# Patient Record
Sex: FEMALE | Race: WHITE | NOT HISPANIC OR LATINO | Employment: UNEMPLOYED | URBAN - METROPOLITAN AREA
[De-identification: names, ages, dates, MRNs, and addresses within clinical notes are randomized per-mention and may not be internally consistent; named-entity substitution may affect disease eponyms.]

---

## 2023-02-22 LAB
ALANINE AMINOTRANSFERASE (SGPT) (U/L) IN SER/PLAS: 14 U/L (ref 7–45)
ALBUMIN (G/DL) IN SER/PLAS: 3.2 G/DL (ref 3.4–5)
ALKALINE PHOSPHATASE (U/L) IN SER/PLAS: 107 U/L (ref 33–110)
ANION GAP IN SER/PLAS: 11 MMOL/L (ref 10–20)
ASPARTATE AMINOTRANSFERASE (SGOT) (U/L) IN SER/PLAS: 14 U/L (ref 9–39)
BILIRUBIN TOTAL (MG/DL) IN SER/PLAS: 0.3 MG/DL (ref 0–1.2)
CALCIUM (MG/DL) IN SER/PLAS: 8.6 MG/DL (ref 8.6–10.3)
CARBON DIOXIDE, TOTAL (MMOL/L) IN SER/PLAS: 24 MMOL/L (ref 21–32)
CHLORIDE (MMOL/L) IN SER/PLAS: 107 MMOL/L (ref 98–107)
CREATININE (MG/DL) IN SER/PLAS: 0.58 MG/DL (ref 0.5–1.05)
ERYTHROCYTE DISTRIBUTION WIDTH (RATIO) BY AUTOMATED COUNT: 17.5 % (ref 11.5–14.5)
ERYTHROCYTE MEAN CORPUSCULAR HEMOGLOBIN CONCENTRATION (G/DL) BY AUTOMATED: 31 G/DL (ref 32–36)
ERYTHROCYTE MEAN CORPUSCULAR VOLUME (FL) BY AUTOMATED COUNT: 85 FL (ref 80–100)
ERYTHROCYTES (10*6/UL) IN BLOOD BY AUTOMATED COUNT: 4.06 X10E12/L (ref 4–5.2)
GFR FEMALE: >90 ML/MIN/1.73M2
GLUCOSE (MG/DL) IN SER/PLAS: 151 MG/DL (ref 74–99)
GLUCOSE, 1 HR SCREEN, PREG: 149 MG/DL
HEMATOCRIT (%) IN BLOOD BY AUTOMATED COUNT: 34.5 % (ref 36–46)
HEMOGLOBIN (G/DL) IN BLOOD: 10.7 G/DL (ref 12–16)
LEUKOCYTES (10*3/UL) IN BLOOD BY AUTOMATED COUNT: 12.1 X10E9/L (ref 4.4–11.3)
PLATELETS (10*3/UL) IN BLOOD AUTOMATED COUNT: 328 X10E9/L (ref 150–450)
POTASSIUM (MMOL/L) IN SER/PLAS: 3.3 MMOL/L (ref 3.5–5.3)
PROTEIN TOTAL: 6.7 G/DL (ref 6.4–8.2)
REFLEX ADDED, ANEMIA PANEL: ABNORMAL
SODIUM (MMOL/L) IN SER/PLAS: 139 MMOL/L (ref 136–145)
UREA NITROGEN (MG/DL) IN SER/PLAS: 5 MG/DL (ref 6–23)

## 2023-02-23 LAB
ANTIBODY SCREEN: NORMAL
FERRITIN, PREGNANCY: 16 UG/L
FOLATE, SERUM, PREGNANCY: >24 NG/ML
IRON (UG/DL) IN SER/PLAS IN PREGNANCY: 55 UG/DL
IRON BINDING CAPACITY (UG/DL) IN PREGNANCY: >505 UG/DL
IRON SATURATION (%) IN PREGNANCY: ABNORMAL %
VITAMIN B12, PREGNANCY: 344 PG/ML

## 2023-03-01 LAB
GLUCOSE THREE HOUR: 143 MG/DL
GLUCOSE TWO HOUR: 172 MG/DL
GLUCOSE, FASTING: 82 MG/DL
GLUCOSE, ONE HOUR: 173 MG/DL
GTTCM: ABNORMAL

## 2023-03-17 LAB
ALANINE AMINOTRANSFERASE (SGPT) (U/L) IN SER/PLAS: 76 U/L (ref 7–45)
ALBUMIN (G/DL) IN SER/PLAS: 3.3 G/DL (ref 3.4–5)
ALKALINE PHOSPHATASE (U/L) IN SER/PLAS: 119 U/L (ref 33–110)
ANION GAP IN SER/PLAS: 14 MMOL/L (ref 10–20)
ASPARTATE AMINOTRANSFERASE (SGOT) (U/L) IN SER/PLAS: 40 U/L (ref 9–39)
BILIRUBIN TOTAL (MG/DL) IN SER/PLAS: 0.3 MG/DL (ref 0–1.2)
CALCIUM (MG/DL) IN SER/PLAS: 8.6 MG/DL (ref 8.6–10.3)
CARBON DIOXIDE, TOTAL (MMOL/L) IN SER/PLAS: 23 MMOL/L (ref 21–32)
CHLORIDE (MMOL/L) IN SER/PLAS: 106 MMOL/L (ref 98–107)
CREATININE (MG/DL) IN SER/PLAS: 0.58 MG/DL (ref 0.5–1.05)
CREATININE (MG/DL) IN URINE: 118 MG/DL (ref 20–320)
ERYTHROCYTE DISTRIBUTION WIDTH (RATIO) BY AUTOMATED COUNT: 18.5 % (ref 11.5–14.5)
ERYTHROCYTE MEAN CORPUSCULAR HEMOGLOBIN CONCENTRATION (G/DL) BY AUTOMATED: 31.2 G/DL (ref 32–36)
ERYTHROCYTE MEAN CORPUSCULAR VOLUME (FL) BY AUTOMATED COUNT: 87 FL (ref 80–100)
ERYTHROCYTES (10*6/UL) IN BLOOD BY AUTOMATED COUNT: 4.02 X10E12/L (ref 4–5.2)
GFR FEMALE: >90 ML/MIN/1.73M2
GLUCOSE (MG/DL) IN SER/PLAS: 80 MG/DL (ref 74–99)
HEMATOCRIT (%) IN BLOOD BY AUTOMATED COUNT: 34.9 % (ref 36–46)
HEMOGLOBIN (G/DL) IN BLOOD: 10.9 G/DL (ref 12–16)
LACTATE DEHYDROGENASE (U/L) IN SER/PLAS BY LAC->PYR RXN: 158 U/L (ref 84–246)
LEUKOCYTES (10*3/UL) IN BLOOD BY AUTOMATED COUNT: 12.6 X10E9/L (ref 4.4–11.3)
PLATELETS (10*3/UL) IN BLOOD AUTOMATED COUNT: 325 X10E9/L (ref 150–450)
POTASSIUM (MMOL/L) IN SER/PLAS: 3.7 MMOL/L (ref 3.5–5.3)
PROTEIN (MG/DL) IN URINE: 20 MG/DL (ref 5–24)
PROTEIN TOTAL: 6.7 G/DL (ref 6.4–8.2)
PROTEIN/CREATININE (MG/MG) IN URINE: 0.17 MG/MG CREAT (ref 0–0.17)
SODIUM (MMOL/L) IN SER/PLAS: 139 MMOL/L (ref 136–145)
URATE (MG/DL) IN SER/PLAS: 4.9 MG/DL (ref 2.3–6.7)
UREA NITROGEN (MG/DL) IN SER/PLAS: 7 MG/DL (ref 6–23)

## 2023-09-19 ENCOUNTER — APPOINTMENT (OUTPATIENT)
Dept: PRIMARY CARE | Facility: CLINIC | Age: 33
End: 2023-09-19
Payer: COMMERCIAL

## 2023-09-26 ENCOUNTER — APPOINTMENT (OUTPATIENT)
Dept: PRIMARY CARE | Facility: CLINIC | Age: 33
End: 2023-09-26
Payer: COMMERCIAL

## 2023-10-04 ENCOUNTER — OFFICE VISIT (OUTPATIENT)
Dept: PRIMARY CARE | Facility: CLINIC | Age: 33
End: 2023-10-04
Payer: COMMERCIAL

## 2023-10-04 VITALS
BODY MASS INDEX: 49.67 KG/M2 | HEIGHT: 60 IN | DIASTOLIC BLOOD PRESSURE: 80 MMHG | WEIGHT: 253 LBS | SYSTOLIC BLOOD PRESSURE: 140 MMHG

## 2023-10-04 DIAGNOSIS — Z00.00 ENCOUNTER FOR PREVENTIVE HEALTH EXAMINATION: Primary | ICD-10-CM

## 2023-10-04 DIAGNOSIS — I87.2 VENOUS INSUFFICIENCY: ICD-10-CM

## 2023-10-04 DIAGNOSIS — R74.8 ELEVATED LIVER ENZYMES: ICD-10-CM

## 2023-10-04 DIAGNOSIS — E66.01 CLASS 3 SEVERE OBESITY WITHOUT SERIOUS COMORBIDITY WITH BODY MASS INDEX (BMI) OF 45.0 TO 49.9 IN ADULT, UNSPECIFIED OBESITY TYPE (MULTI): ICD-10-CM

## 2023-10-04 DIAGNOSIS — M54.50 CHRONIC LOW BACK PAIN WITHOUT SCIATICA, UNSPECIFIED BACK PAIN LATERALITY: ICD-10-CM

## 2023-10-04 DIAGNOSIS — F41.1 GAD (GENERALIZED ANXIETY DISORDER): ICD-10-CM

## 2023-10-04 DIAGNOSIS — Z23 ENCOUNTER FOR IMMUNIZATION: ICD-10-CM

## 2023-10-04 DIAGNOSIS — I10 PRIMARY HYPERTENSION: ICD-10-CM

## 2023-10-04 DIAGNOSIS — G89.29 CHRONIC LOW BACK PAIN WITHOUT SCIATICA, UNSPECIFIED BACK PAIN LATERALITY: ICD-10-CM

## 2023-10-04 PROBLEM — E66.813 CLASS 3 SEVERE OBESITY WITHOUT SERIOUS COMORBIDITY WITH BODY MASS INDEX (BMI) OF 45.0 TO 49.9 IN ADULT: Status: ACTIVE | Noted: 2023-10-04

## 2023-10-04 PROCEDURE — 90471 IMMUNIZATION ADMIN: CPT | Performed by: STUDENT IN AN ORGANIZED HEALTH CARE EDUCATION/TRAINING PROGRAM

## 2023-10-04 PROCEDURE — 3079F DIAST BP 80-89 MM HG: CPT | Performed by: STUDENT IN AN ORGANIZED HEALTH CARE EDUCATION/TRAINING PROGRAM

## 2023-10-04 PROCEDURE — 90686 IIV4 VACC NO PRSV 0.5 ML IM: CPT | Performed by: STUDENT IN AN ORGANIZED HEALTH CARE EDUCATION/TRAINING PROGRAM

## 2023-10-04 PROCEDURE — 1036F TOBACCO NON-USER: CPT | Performed by: STUDENT IN AN ORGANIZED HEALTH CARE EDUCATION/TRAINING PROGRAM

## 2023-10-04 PROCEDURE — 3077F SYST BP >= 140 MM HG: CPT | Performed by: STUDENT IN AN ORGANIZED HEALTH CARE EDUCATION/TRAINING PROGRAM

## 2023-10-04 PROCEDURE — 99214 OFFICE O/P EST MOD 30 MIN: CPT | Performed by: STUDENT IN AN ORGANIZED HEALTH CARE EDUCATION/TRAINING PROGRAM

## 2023-10-04 PROCEDURE — 99395 PREV VISIT EST AGE 18-39: CPT | Performed by: STUDENT IN AN ORGANIZED HEALTH CARE EDUCATION/TRAINING PROGRAM

## 2023-10-04 PROCEDURE — 3008F BODY MASS INDEX DOCD: CPT | Performed by: STUDENT IN AN ORGANIZED HEALTH CARE EDUCATION/TRAINING PROGRAM

## 2023-10-04 RX ORDER — SERTRALINE HYDROCHLORIDE 50 MG/1
50 TABLET, FILM COATED ORAL DAILY
Qty: 90 TABLET | Refills: 1 | Status: SHIPPED | OUTPATIENT
Start: 2023-10-04 | End: 2024-04-22

## 2023-10-04 RX ORDER — AMLODIPINE BESYLATE 5 MG/1
5 TABLET ORAL DAILY
Qty: 90 TABLET | Refills: 1 | Status: SHIPPED | OUTPATIENT
Start: 2023-10-04 | End: 2024-04-04

## 2023-10-04 RX ORDER — SERTRALINE HYDROCHLORIDE 50 MG/1
50 TABLET, FILM COATED ORAL DAILY
COMMUNITY
Start: 2023-09-20 | End: 2023-10-04 | Stop reason: SDUPTHER

## 2023-10-04 RX ORDER — HYDROCHLOROTHIAZIDE 12.5 MG/1
12.5 TABLET ORAL DAILY
Qty: 90 TABLET | Refills: 1 | Status: SHIPPED | OUTPATIENT
Start: 2023-10-04 | End: 2024-04-04

## 2023-10-04 RX ORDER — AMLODIPINE BESYLATE 5 MG/1
5 TABLET ORAL DAILY
COMMUNITY
End: 2023-10-04 | Stop reason: SDUPTHER

## 2023-10-04 RX ORDER — HYDROCHLOROTHIAZIDE 12.5 MG/1
12.5 TABLET ORAL DAILY
COMMUNITY
Start: 2023-07-24 | End: 2023-10-04 | Stop reason: SDUPTHER

## 2023-10-04 ASSESSMENT — PATIENT HEALTH QUESTIONNAIRE - PHQ9
2. FEELING DOWN, DEPRESSED OR HOPELESS: NOT AT ALL
1. LITTLE INTEREST OR PLEASURE IN DOING THINGS: NOT AT ALL
SUM OF ALL RESPONSES TO PHQ9 QUESTIONS 1 AND 2: 0

## 2023-10-04 NOTE — PROGRESS NOTES
"Casa Verdugo is a 33 y.o. year old female presenting for Annual Exam      Subjective   Patient ID: Casa Verdugo is a 33 y.o. female who presents for Annual Exam.    HPI  Working on a making diet changes.  Working on adding regular exercise to their routine.  PAP is up to date. 2023 at 8 week check  Vaccines are not up to date; they are due for: Influenza   Does not wear contacts or glasses and reports no visual problems.  Patient is not fasting for labs today.     Other concerns addressed today include:   Zoloft- post partum depression; would like refill   HTN- Hydrochlorothiazide - on this for lymphedema/HTN; would like refill ; also on amlodipine, needs refill  Low back pain - herniated disks on MRI in 2013, back is bothering her when she bends down to  baby/baby carrier, is willing to try PT  Hair loss - wants thyroid checked  Liver enzyme elevation during childbirth - would like liver enzymes checked  Weight loss - has tried weight loss pills primarily stimulant based in the past, never really tried a diet/exercise regimen    Also has 4 y/o child at home  Child is 6 months old; limited breast milk production     Family History:  Diabetes  Obesity  Heart disease  Mental health   Father - COPD (smoker)    Pt former smoker - quit 3-4 years ago, was smoking 1 pack per day    RAMAN screening:  STOP-BANG: denies snoring, witnessed apnea, does admit daytime sleepiness, neck >15\"     Review of Systems  All pertinent positive symptoms are included in the history of present illness.    All other systems have been reviewed and are negative and noncontributory to this patient's current ailments.     Social History     Tobacco Use   • Smoking status: Former     Types: Cigarettes   • Smokeless tobacco: Never   Substance Use Topics   • Alcohol use: Not on file      Past Surgical History:   Procedure Laterality Date   • OTHER SURGICAL HISTORY  05/14/2014    Dental Surgery   • OTHER SURGICAL HISTORY  09/08/2020    "  section   • OTHER SURGICAL HISTORY  10/23/2014    Stab Phlebectomy Of Varicose Veins      Allergies   Allergen Reactions   • Hydrocodone-Acetaminophen Nausea Only and Nausea/vomiting        Current Outpatient Medications   Medication Sig Dispense Refill   • amLODIPine (Norvasc) 5 mg tablet Take 1 tablet (5 mg) by mouth once daily.     • hydroCHLOROthiazide (HYDRODiuril) 12.5 mg tablet Take 1 tablet (12.5 mg) by mouth once daily.     • sertraline (Zoloft) 50 mg tablet Take 1 tablet (50 mg) by mouth once daily.       No current facility-administered medications for this visit.        There is no problem list on file for this patient.     Immunization History   Administered Date(s) Administered   • Pfizer Purple Cap SARS-CoV-2 2021, 2021       Objective   VITALS  There were no vitals taken for this visit.     Physical Exam  CONSTITUTIONAL - obese, well nourished, well developed, looks like stated age, in no acute distress, not ill-appearing, and not tired appearing  SKIN - normal skin color and pigmentation, normal skin turgor without rash, lesions, or nodules visualized  HEAD - no trauma, normocephalic  EYES - extraocular muscles are intact, and normal external exam  NECK - supple without rigidity, no neck mass was observed, no thyromegaly or thyroid nodules  CHEST - clear to auscultation, no wheezing, no crackles and no rales, good effort  CARDIAC - regular rate and regular rhythm, no skipped beats, no murmur  ABDOMEN - no organomegaly, soft, nontender, nondistended, normal bowel sounds, no guarding/rebound/rigidity, negative McBurney sign and negative Rizvi sign  EXTREMITIES - no edema, no deformities  NEUROLOGICAL - normal gait, normal balance, normal motor, no ataxia, DTRs equal and symmetrical; alert, oriented and no focal signs  PSYCHIATRIC - alert, pleasant and cordial, age-appropriate  IMMUNOLOGIC - no cervical lymphadenopathy         Assessment/Plan   32 y/o F presents today to establish  care/AWV and with multiple complaints: Fasting labs, influenza vaccine  Post partum Depression: well managed currently, Continue zoloft 50 mg   HTN: Not well managed in office today, continue amlodipine 5 mg and add on hydrochlorothiazide 12.5 mg  Weight loss: counseled on healthy diet/exercise/lifestyle changes, will see what bloodwork shows for pharmaceutical assistance  Hair loss: TSH level  Back pain: PT referral, x-ray  Liver enzyme elevation: Recheck CMP  Lymphedema: Continue to monitor  Return in 4-6 weeks for follow up for HTN blood pressure check and med check

## 2023-10-05 ENCOUNTER — LAB (OUTPATIENT)
Dept: LAB | Facility: LAB | Age: 33
End: 2023-10-05

## 2023-10-05 DIAGNOSIS — Z00.00 ENCOUNTER FOR PREVENTIVE HEALTH EXAMINATION: ICD-10-CM

## 2023-10-05 LAB
ALBUMIN SERPL BCP-MCNC: 4.1 G/DL (ref 3.4–5)
ALP SERPL-CCNC: 107 U/L (ref 33–110)
ALT SERPL W P-5'-P-CCNC: 17 U/L (ref 7–45)
ANION GAP SERPL CALC-SCNC: 13 MMOL/L (ref 10–20)
AST SERPL W P-5'-P-CCNC: 18 U/L (ref 9–39)
BILIRUB SERPL-MCNC: 0.4 MG/DL (ref 0–1.2)
BUN SERPL-MCNC: 10 MG/DL (ref 6–23)
CALCIUM SERPL-MCNC: 9.2 MG/DL (ref 8.6–10.3)
CHLORIDE SERPL-SCNC: 102 MMOL/L (ref 98–107)
CHOLEST SERPL-MCNC: 208 MG/DL (ref 0–199)
CHOLESTEROL/HDL RATIO: 4.1
CO2 SERPL-SCNC: 24 MMOL/L (ref 21–32)
CREAT SERPL-MCNC: 0.7 MG/DL (ref 0.5–1.05)
ERYTHROCYTE [DISTWIDTH] IN BLOOD BY AUTOMATED COUNT: 17.6 % (ref 11.5–14.5)
EST. AVERAGE GLUCOSE BLD GHB EST-MCNC: 120 MG/DL
GFR SERPL CREATININE-BSD FRML MDRD: >90 ML/MIN/1.73M*2
GLUCOSE SERPL-MCNC: 89 MG/DL (ref 74–99)
HBA1C MFR BLD: 5.8 %
HCT VFR BLD AUTO: 41.3 % (ref 36–46)
HDLC SERPL-MCNC: 51.1 MG/DL
HGB BLD-MCNC: 12.6 G/DL (ref 12–16)
LDLC SERPL CALC-MCNC: 131 MG/DL (ref 130–180)
MCH RBC QN AUTO: 24.6 PG (ref 26–34)
MCHC RBC AUTO-ENTMCNC: 30.5 G/DL (ref 32–36)
MCV RBC AUTO: 81 FL (ref 80–100)
NON HDL CHOLESTEROL: 157 MG/DL (ref 0–149)
NRBC BLD-RTO: 0 /100 WBCS (ref 0–0)
PLATELET # BLD AUTO: 398 X10*3/UL (ref 150–450)
PMV BLD AUTO: 10.7 FL (ref 7.5–11.5)
POTASSIUM SERPL-SCNC: 4.1 MMOL/L (ref 3.5–5.3)
PROT SERPL-MCNC: 7.8 G/DL (ref 6.4–8.2)
RBC # BLD AUTO: 5.12 X10*6/UL (ref 4–5.2)
SODIUM SERPL-SCNC: 135 MMOL/L (ref 136–145)
TRIGL SERPL-MCNC: 132 MG/DL (ref 0–149)
TSH SERPL-ACNC: 1.74 MIU/L (ref 0.44–3.98)
VLDL: 26 MG/DL (ref 0–40)
WBC # BLD AUTO: 11.9 X10*3/UL (ref 4.4–11.3)

## 2023-10-05 PROCEDURE — 80061 LIPID PANEL: CPT

## 2023-10-05 PROCEDURE — 83036 HEMOGLOBIN GLYCOSYLATED A1C: CPT

## 2023-10-05 PROCEDURE — 85027 COMPLETE CBC AUTOMATED: CPT

## 2023-10-05 PROCEDURE — 84443 ASSAY THYROID STIM HORMONE: CPT

## 2023-10-05 PROCEDURE — 36415 COLL VENOUS BLD VENIPUNCTURE: CPT

## 2023-10-05 PROCEDURE — 80053 COMPREHEN METABOLIC PANEL: CPT

## 2023-10-09 ENCOUNTER — TELEPHONE (OUTPATIENT)
Dept: PRIMARY CARE | Facility: CLINIC | Age: 33
End: 2023-10-09
Payer: COMMERCIAL

## 2023-10-09 DIAGNOSIS — R73.03 PREDIABETES: Primary | ICD-10-CM

## 2023-10-09 DIAGNOSIS — E88.819 INSULIN RESISTANCE: ICD-10-CM

## 2023-10-09 DIAGNOSIS — E66.01 CLASS 3 SEVERE OBESITY DUE TO EXCESS CALORIES WITHOUT SERIOUS COMORBIDITY WITH BODY MASS INDEX (BMI) OF 45.0 TO 49.9 IN ADULT (MULTI): ICD-10-CM

## 2023-10-10 ENCOUNTER — ANCILLARY PROCEDURE (OUTPATIENT)
Dept: RADIOLOGY | Facility: CLINIC | Age: 33
End: 2023-10-10
Payer: COMMERCIAL

## 2023-10-10 DIAGNOSIS — G89.29 CHRONIC LOW BACK PAIN WITHOUT SCIATICA, UNSPECIFIED BACK PAIN LATERALITY: ICD-10-CM

## 2023-10-10 DIAGNOSIS — M54.50 CHRONIC LOW BACK PAIN WITHOUT SCIATICA, UNSPECIFIED BACK PAIN LATERALITY: ICD-10-CM

## 2023-10-10 PROCEDURE — 72100 X-RAY EXAM L-S SPINE 2/3 VWS: CPT | Mod: FY

## 2023-10-10 PROCEDURE — 72100 X-RAY EXAM L-S SPINE 2/3 VWS: CPT | Performed by: RADIOLOGY

## 2023-10-10 RX ORDER — TIRZEPATIDE 2.5 MG/.5ML
2.5 INJECTION, SOLUTION SUBCUTANEOUS
Qty: 2 ML | Refills: 0 | Status: SHIPPED | OUTPATIENT
Start: 2023-10-10 | End: 2023-11-10

## 2023-10-10 NOTE — TELEPHONE ENCOUNTER
I sent it.  It looks like she will need a prior authorization especially since she does not really have diabetes, just prediabetes

## 2023-10-18 DIAGNOSIS — E66.01 CLASS 3 SEVERE OBESITY DUE TO EXCESS CALORIES WITHOUT SERIOUS COMORBIDITY WITH BODY MASS INDEX (BMI) OF 45.0 TO 49.9 IN ADULT (MULTI): ICD-10-CM

## 2023-10-18 DIAGNOSIS — G89.29 CHRONIC LOW BACK PAIN WITHOUT SCIATICA, UNSPECIFIED BACK PAIN LATERALITY: Primary | ICD-10-CM

## 2023-10-18 DIAGNOSIS — M54.50 CHRONIC LOW BACK PAIN WITHOUT SCIATICA, UNSPECIFIED BACK PAIN LATERALITY: Primary | ICD-10-CM

## 2023-11-02 ENCOUNTER — APPOINTMENT (OUTPATIENT)
Dept: PRIMARY CARE | Facility: CLINIC | Age: 33
End: 2023-11-02
Payer: COMMERCIAL

## 2023-11-10 ENCOUNTER — OFFICE VISIT (OUTPATIENT)
Dept: PRIMARY CARE | Facility: CLINIC | Age: 33
End: 2023-11-10
Payer: COMMERCIAL

## 2023-11-10 VITALS
WEIGHT: 251 LBS | BODY MASS INDEX: 49.28 KG/M2 | DIASTOLIC BLOOD PRESSURE: 84 MMHG | HEIGHT: 60 IN | HEART RATE: 90 BPM | SYSTOLIC BLOOD PRESSURE: 130 MMHG

## 2023-11-10 DIAGNOSIS — E66.01 CLASS 3 SEVERE OBESITY WITHOUT SERIOUS COMORBIDITY WITH BODY MASS INDEX (BMI) OF 45.0 TO 49.9 IN ADULT, UNSPECIFIED OBESITY TYPE (MULTI): ICD-10-CM

## 2023-11-10 DIAGNOSIS — I10 PRIMARY HYPERTENSION: Primary | ICD-10-CM

## 2023-11-10 PROCEDURE — 99213 OFFICE O/P EST LOW 20 MIN: CPT | Performed by: STUDENT IN AN ORGANIZED HEALTH CARE EDUCATION/TRAINING PROGRAM

## 2023-11-10 PROCEDURE — 3075F SYST BP GE 130 - 139MM HG: CPT | Performed by: STUDENT IN AN ORGANIZED HEALTH CARE EDUCATION/TRAINING PROGRAM

## 2023-11-10 PROCEDURE — 1036F TOBACCO NON-USER: CPT | Performed by: STUDENT IN AN ORGANIZED HEALTH CARE EDUCATION/TRAINING PROGRAM

## 2023-11-10 PROCEDURE — 3079F DIAST BP 80-89 MM HG: CPT | Performed by: STUDENT IN AN ORGANIZED HEALTH CARE EDUCATION/TRAINING PROGRAM

## 2023-11-10 PROCEDURE — 3008F BODY MASS INDEX DOCD: CPT | Performed by: STUDENT IN AN ORGANIZED HEALTH CARE EDUCATION/TRAINING PROGRAM

## 2023-11-10 NOTE — PROGRESS NOTES
Subjective   Patient ID: Casa Verdugo is a 33 y.o. female who presents for Follow-up.  HPI  She is 33 years old female who presents today to the office for 1 month follow-up after starting BP meds.  Her blood pressure today is controlled with this medication.  Patient does not have any concerns.  She mentioned that it is hard to exercise with 2 kids at home but she is having coming up appointment with an addiction specialist.  She also has tried to change her diet by reducing the snacks.    Denies new onset headaches, fever, chills, n/v/d, chest pain, SOB, abdominal pain, urinary symptoms, and lower extremity edema.     Review of Systems  All other systems have been reviewed and are negative.    Visit Vitals  /84   Pulse 90   Ht 1.524 m (5')   Wt 114 kg (251 lb)   BMI 49.02 kg/m²   Smoking Status Former   BSA 2.2 m²       Objective   Physical Exam  General: Alert and oriented. Appears well-nourished and in no acute distress, overweight  Eyes: PERRLA. EOMI.  Head/neck: Normocephalic. Supple.  Lymphatics: No cervical lymphadenopathy.  Respiratory/Thorax: Clear to auscultation bilaterally. No wheezing.   Cardiovascular: Regular rate and rhythm. No murmurs.  Gastrointestinal: Soft, nontender, nondistended. +BS   Musculoskeletal: ROM intact. No joint swelling. Normal strength   Extremities: Warm and well perfused. No peripheral edema.  Neurological: No gross neurologic deficits.   Psychological: Appropriate mood and affect.   Skin: No visible rashes or lesions.     Assessment/Plan   She is 33 years old female who came today to the office for follow-up on her blood pressure    #Hypertension  -Patient is on 2 medication amlodipine 5 mg, hydrochlorothiazide 12.5 mg daily  -Continue with this medication  -She also has a nutrition specialist appointment coming up  -Hard to exercise at this time.    No red flags. Follow up in  in a year or as needed    Problem List Items Addressed This Visit       Class 3 severe  obesity without serious comorbidity with body mass index (BMI) of 45.0 to 49.9 in adult (CMS/[facility])    Primary hypertension - Primary       I have personally reviewed all available pertinent labs, imaging, and consult notes with the patient.     All questions and concerns were addressed. Patient verbalizes understanding instructions and agrees with established plan of care.     I discussed the plan with Dr. Froilan Stewart MD  Family medicine resident  PGY2

## 2024-03-06 ENCOUNTER — OFFICE VISIT (OUTPATIENT)
Dept: PRIMARY CARE | Facility: CLINIC | Age: 34
End: 2024-03-06
Payer: COMMERCIAL

## 2024-03-06 VITALS
SYSTOLIC BLOOD PRESSURE: 120 MMHG | DIASTOLIC BLOOD PRESSURE: 78 MMHG | WEIGHT: 258 LBS | BODY MASS INDEX: 50.39 KG/M2 | HEART RATE: 110 BPM

## 2024-03-06 DIAGNOSIS — G57.12 MERALGIA PARESTHETICA OF LEFT SIDE: Primary | ICD-10-CM

## 2024-03-06 PROCEDURE — 3074F SYST BP LT 130 MM HG: CPT | Performed by: STUDENT IN AN ORGANIZED HEALTH CARE EDUCATION/TRAINING PROGRAM

## 2024-03-06 PROCEDURE — 99213 OFFICE O/P EST LOW 20 MIN: CPT | Performed by: STUDENT IN AN ORGANIZED HEALTH CARE EDUCATION/TRAINING PROGRAM

## 2024-03-06 PROCEDURE — 1036F TOBACCO NON-USER: CPT | Performed by: STUDENT IN AN ORGANIZED HEALTH CARE EDUCATION/TRAINING PROGRAM

## 2024-03-06 PROCEDURE — 3008F BODY MASS INDEX DOCD: CPT | Performed by: STUDENT IN AN ORGANIZED HEALTH CARE EDUCATION/TRAINING PROGRAM

## 2024-03-06 PROCEDURE — 3078F DIAST BP <80 MM HG: CPT | Performed by: STUDENT IN AN ORGANIZED HEALTH CARE EDUCATION/TRAINING PROGRAM

## 2024-03-06 NOTE — PROGRESS NOTES
Subjective   Patient ID: Casa Verdugo is a 34 y.o. female who presents for Left Thigh Pain.  HPI  She is complaining about numbness on the lateral part of the left leg which for the past 2 weeks has been painful.  The pain is 4 out of 10 but it goes 8 out of 10 sometimes.  During the past 2 weeks the pain has been stabbing, burning hurting to touch.  No radiation, no trauma.  Not sure what makes it better or worse(may be standing maybe walking, patient is not sure)  She applied Tylenol and muscle rubber but did not help with the pain  She has varicose vein bilateral and lymphedema.  She had a back pain for which she was evaluated before with x-ray and PT was recommended at that time she did not go to the PT due to her kid.    Denies new onset headaches, fever, chills, n/v/d, chest pain, SOB, abdominal pain, urinary symptoms, and extremity edema more than before.     Review of Systems  All other systems have been reviewed and are negative.    Visit Vitals  /78 (BP Location: Left arm, Patient Position: Sitting, BP Cuff Size: Large adult)   Pulse 110   Wt 117 kg (258 lb)   BMI 50.39 kg/m²   Smoking Status Former   BSA 2.23 m²       Objective   Physical Exam  General: Alert and oriented. Appears well-nourished and in no acute distress.  Eyes: PERRLA. EOMI.  Head/neck: Normocephalic. Supple.  Lymphatics: No cervical lymphadenopathy.  Respiratory/Thorax: Clear to auscultation bilaterally. No wheezing.   Cardiovascular: Regular rate and rhythm. No murmurs.  Gastrointestinal: Soft, nontender, nondistended. +BS   Musculoskeletal: ROM intact. No joint swelling. Normal strength straight leg test was positive for stretch of the muscles in the back of the leg but not for numbness and tingling down to the toes.  Promise test was negative.  No tenderness over the trochanteric bursa.   Extremities: Warm and well perfused. No peripheral edema.  Neurological: No gross neurologic deficits.   Psychological: Appropriate mood and  affect.   Skin: No visible rashes or lesions.     Assessment/Plan   She is 34 years old female with past medical history of varicose vein, obesity, PARDEEP who presented today to the office for lateral pain of the left thigh for the past 2 weeks.  At the beginning was 9 but now the pain is stabbing or burning    # L lateral thigh pain/stubbing pain due to meralgia paresthetica versus shingles  -Supportive treatment for now  -Apply Voltaren gel at home for pain  -Reducing weight would help and wearing comfortable clothes, avoid tight leggings.  -If the symptoms do not go away within a month we can try gabapentin   -If any rash appear call us for evaluation, ( maybe this will be the signs of HERPES zoster infection) and treatment with acyclovir would be appropriate    No red flags. Follow up as needed    Problem List Items Addressed This Visit    None  Visit Diagnoses       Meralgia paresthetica of left side    -  Primary            I have personally reviewed all available pertinent labs, imaging, and consult notes with the patient.     All questions and concerns were addressed. Patient verbalizes understanding instructions and agrees with established plan of care.     I discussed the plan with Dr.Shahrimanyan Isabelle Stewart MD  Family medicine resident  PGY2

## 2024-04-01 ENCOUNTER — OFFICE VISIT (OUTPATIENT)
Dept: PRIMARY CARE | Facility: CLINIC | Age: 34
End: 2024-04-01
Payer: COMMERCIAL

## 2024-04-01 ENCOUNTER — APPOINTMENT (OUTPATIENT)
Dept: PRIMARY CARE | Facility: CLINIC | Age: 34
End: 2024-04-01
Payer: COMMERCIAL

## 2024-04-01 VITALS
HEIGHT: 60 IN | DIASTOLIC BLOOD PRESSURE: 80 MMHG | BODY MASS INDEX: 51.04 KG/M2 | HEART RATE: 111 BPM | WEIGHT: 260 LBS | SYSTOLIC BLOOD PRESSURE: 130 MMHG

## 2024-04-01 DIAGNOSIS — J02.9 PHARYNGITIS, UNSPECIFIED ETIOLOGY: ICD-10-CM

## 2024-04-01 DIAGNOSIS — R09.89 SYMPTOMS OF UPPER RESPIRATORY INFECTION (URI): ICD-10-CM

## 2024-04-01 DIAGNOSIS — H69.93 DYSFUNCTION OF BOTH EUSTACHIAN TUBES: ICD-10-CM

## 2024-04-01 PROBLEM — O99.340 DEPRESSIVE DISORDER IN MOTHER AFFECTING PREGNANCY (HHS-HCC): Status: ACTIVE | Noted: 2024-04-01

## 2024-04-01 PROBLEM — O92.70 DISORDER OF LACTATION (HHS-HCC): Status: ACTIVE | Noted: 2024-04-01

## 2024-04-01 PROBLEM — F32.A DEPRESSIVE DISORDER IN MOTHER AFFECTING PREGNANCY (HHS-HCC): Status: ACTIVE | Noted: 2024-04-01

## 2024-04-01 PROBLEM — I89.0 LYMPHEDEMA OF LEFT LOWER EXTREMITY: Status: ACTIVE | Noted: 2024-04-01

## 2024-04-01 PROBLEM — M79.89 SYMPTOM OF LEG SWELLING: Status: ACTIVE | Noted: 2024-04-01

## 2024-04-01 PROBLEM — B35.4 TINEA CORPORIS: Status: ACTIVE | Noted: 2024-04-01

## 2024-04-01 PROBLEM — J06.9 VIRAL UPPER RESPIRATORY TRACT INFECTION: Status: ACTIVE | Noted: 2023-02-08

## 2024-04-01 PROBLEM — Z86.79 HISTORY OF HYPERTENSION: Status: ACTIVE | Noted: 2024-04-01

## 2024-04-01 PROBLEM — K21.9 GASTROESOPHAGEAL REFLUX DISEASE WITHOUT ESOPHAGITIS: Status: ACTIVE | Noted: 2024-04-01

## 2024-04-01 LAB — S PYO DNA THROAT QL NAA+PROBE: NOT DETECTED

## 2024-04-01 PROCEDURE — 99214 OFFICE O/P EST MOD 30 MIN: CPT | Performed by: FAMILY MEDICINE

## 2024-04-01 PROCEDURE — 87651 STREP A DNA AMP PROBE: CPT

## 2024-04-01 PROCEDURE — 3075F SYST BP GE 130 - 139MM HG: CPT | Performed by: FAMILY MEDICINE

## 2024-04-01 PROCEDURE — 3079F DIAST BP 80-89 MM HG: CPT | Performed by: FAMILY MEDICINE

## 2024-04-01 PROCEDURE — 1036F TOBACCO NON-USER: CPT | Performed by: FAMILY MEDICINE

## 2024-04-01 PROCEDURE — 3008F BODY MASS INDEX DOCD: CPT | Performed by: FAMILY MEDICINE

## 2024-04-01 RX ORDER — FLUTICASONE PROPIONATE 50 MCG
2 SPRAY, SUSPENSION (ML) NASAL 2 TIMES DAILY PRN
Qty: 9.9 ML | Refills: 0 | Status: SHIPPED | OUTPATIENT
Start: 2024-04-01 | End: 2024-05-15 | Stop reason: WASHOUT

## 2024-04-01 ASSESSMENT — PATIENT HEALTH QUESTIONNAIRE - PHQ9
2. FEELING DOWN, DEPRESSED OR HOPELESS: NOT AT ALL
SUM OF ALL RESPONSES TO PHQ9 QUESTIONS 1 AND 2: 0
1. LITTLE INTEREST OR PLEASURE IN DOING THINGS: NOT AT ALL

## 2024-04-01 ASSESSMENT — ENCOUNTER SYMPTOMS
STRIDOR: 0
DIARRHEA: 0
SHORTNESS OF BREATH: 0
SWOLLEN GLANDS: 1
HOARSE VOICE: 1
SORE THROAT: 1
VOMITING: 0
ABDOMINAL PAIN: 0
HEADACHES: 0
TROUBLE SWALLOWING: 1
NECK PAIN: 1
COUGH: 1

## 2024-04-01 NOTE — ASSESSMENT & PLAN NOTE
Again, likely to be secondary to a viral source given that you're around kids all day  We collectively decided against testing for COVID and flu today in the absence of fever and body aches

## 2024-04-01 NOTE — ASSESSMENT & PLAN NOTE
Low centor score but exam findings of erythematous posterior oropharynx and swollen tonsils so we decided to test for strep  If positive, we will send Augmentin to pharmacy  Continue to treat with conservative measures

## 2024-04-01 NOTE — PROGRESS NOTES
Subjective   Patient ID: Casa Verdugo is a 34 y.o. female who presents for Sinusitis (Sore throat, congestion, runny nose, cough).    Past Medical, Surgical, and Family History reviewed and updated in chart.    Reviewed all medications by prescribing practitioner or clinical pharmacist (such as prescriptions, OTCs, herbal therapies and supplements) and documented in the medical record.    BRIDGETTE Cormier came in with a four-day history of nasal congestion and thick green and yellow nasal discharge. She also reports having a sore throat and a productive cough. Alongside these symptoms, she is experiencing a sensation of her ears popping every time she swallows, which is accompanied by muffled hearing.    Casa denies having a fever, headache, body aches, shortness of breath, wheezing, nausea, vomiting, diarrhea, or any recent contact with sick individuals. She has been taking over-the-counter remedies like DayQuil, but these have provided only minimal relief.    Review of Systems  All pertinent positive symptoms are included in the history of present illness.    All other systems have been reviewed and are negative and noncontributory to this patient's current ailments.    Past Medical History:   Diagnosis Date    Encounter for gynecological examination (general) (routine) without abnormal findings 09/08/2020    Women's annual routine gynecological examination    Lymphedema, not elsewhere classified     Lymphedema of left lower extremity    Personal history of other diseases of the circulatory system     History of hypertension    Personal history of other diseases of the digestive system     History of abdominal hernia    Personal history of other diseases of the musculoskeletal system and connective tissue     History of herniated intervertebral disc    Unspecified open wound of unspecified finger without damage to nail, initial encounter 09/06/2019    Avulsion of skin of finger, initial encounter     Past  Surgical History:   Procedure Laterality Date    OTHER SURGICAL HISTORY  2014    Dental Surgery    OTHER SURGICAL HISTORY  2020     section    OTHER SURGICAL HISTORY  10/23/2014    Stab Phlebectomy Of Varicose Veins     Social History     Tobacco Use    Smoking status: Former     Types: Cigarettes    Smokeless tobacco: Never     No family history on file.  Immunization History   Administered Date(s) Administered    Flu vaccine (IIV4), preservative free *Check age/dose* 10/23/2019, 10/17/2022, 10/04/2023    Influenza, injectable, quadrivalent 10/19/2020, 2022    Influenza, seasonal, injectable 10/24/2016, 2018    Pfizer Purple Cap SARS-CoV-2 2021, 2021    Rho(D)-IG IM 2023    Tdap vaccine, age 7 year and older (BOOSTRIX, ADACEL) 2018, 2023     Current Outpatient Medications   Medication Instructions    amLODIPine (NORVASC) 5 mg, oral, Daily    fluticasone (Flonase) 50 mcg/actuation nasal spray 2 sprays, Each Nostril, 2 times daily PRN, Shake gently. Before first use, prime pump. After use, clean tip and replace cap.    hydroCHLOROthiazide (MICROZIDE) 12.5 mg, oral, Daily    sertraline (ZOLOFT) 50 mg, oral, Daily     Allergies   Allergen Reactions    Hydrocodone-Acetaminophen Nausea Only and Nausea/vomiting       Objective   Vitals:    24 1055   BP: 130/80   Pulse: (!) 111   Weight: 118 kg (260 lb)   Height: 1.524 m (5')     Body mass index is 50.78 kg/m².    BP Readings from Last 3 Encounters:   24 130/80   24 120/78   11/10/23 130/84      Wt Readings from Last 3 Encounters:   24 118 kg (260 lb)   24 117 kg (258 lb)   11/10/23 114 kg (251 lb)        No visits with results within 1 Month(s) from this visit.   Latest known visit with results is:   Lab on 10/05/2023   Component Date Value    WBC 10/05/2023 11.9 (H)     nRBC 10/05/2023 0.0     RBC 10/05/2023 5.12     Hemoglobin 10/05/2023 12.6     Hematocrit 10/05/2023 41.3      MCV 10/05/2023 81     MCH 10/05/2023 24.6 (L)     MCHC 10/05/2023 30.5 (L)     RDW 10/05/2023 17.6 (H)     Platelets 10/05/2023 398     MPV 10/05/2023 10.7     Glucose 10/05/2023 89     Sodium 10/05/2023 135 (L)     Potassium 10/05/2023 4.1     Chloride 10/05/2023 102     Bicarbonate 10/05/2023 24     Anion Gap 10/05/2023 13     Urea Nitrogen 10/05/2023 10     Creatinine 10/05/2023 0.70     eGFR 10/05/2023 >90     Calcium 10/05/2023 9.2     Albumin 10/05/2023 4.1     Alkaline Phosphatase 10/05/2023 107     Total Protein 10/05/2023 7.8     AST 10/05/2023 18     Bilirubin, Total 10/05/2023 0.4     ALT 10/05/2023 17     Hemoglobin A1C 10/05/2023 5.8 (H)     Estimated Average Glucose 10/05/2023 120     Cholesterol 10/05/2023 208 (H)     HDL-Cholesterol 10/05/2023 51.1     Cholesterol/HDL Ratio 10/05/2023 4.1     LDL Calculated 10/05/2023 131     VLDL 10/05/2023 26     Triglycerides 10/05/2023 132     Non HDL Cholesterol 10/05/2023 157 (H)     Thyroid Stimulating Horm* 10/05/2023 1.74      Physical Exam  CONSTITUTIONAL - well nourished, well developed, looks like stated age, in no acute distress, not ill-appearing, and not tired appearing  SKIN - normal skin color and pigmentation, normal skin turgor without rash, lesions, or nodules visualized  HEAD - no trauma, normocephalic  EYES - pupils are equal and reactive to light, extraocular muscles are intact, and normal external exam  ENT - TM's intact, mild injection over superior rim of right TM, no signs of infection, uvula midline, normal tongue movement, erythematous and swollen tonsils R>L with no signs of exudate  NECK - supple without rigidity, no neck mass was observed, no thyromegaly or thyroid nodules  EXTREMITIES - no obvious or evident edema, no obvious or evident deformities  PSYCHIATRIC - alert, pleasant and cordial, age-appropriate  IMMUNOLOGIC - no cervical lymphadenopathy    Assessment/Plan   Problem List Items Addressed This Visit       Symptoms of upper  respiratory infection (URI)     Again, likely to be secondary to a viral source given that you're around kids all day  We collectively decided against testing for COVID and flu today in the absence of fever and body aches         Relevant Medications    fluticasone (Flonase) 50 mcg/actuation nasal spray    Other Relevant Orders    Group A Streptococcus, PCR    Pharyngitis     Low centor score but exam findings of erythematous posterior oropharynx and swollen tonsils so we decided to test for strep  If positive, we will send Augmentin to pharmacy  Continue to treat with conservative measures         Relevant Orders    Group A Streptococcus, PCR    Dysfunction of both eustachian tubes     You may take Flonase up to twice daily as needed until symptoms resolve         Relevant Medications    fluticasone (Flonase) 50 mcg/actuation nasal spray

## 2024-04-03 DIAGNOSIS — J06.9 BACTERIAL URI: Primary | ICD-10-CM

## 2024-04-03 DIAGNOSIS — B96.89 BACTERIAL URI: Primary | ICD-10-CM

## 2024-04-03 RX ORDER — AMOXICILLIN AND CLAVULANATE POTASSIUM 875; 125 MG/1; MG/1
875 TABLET, FILM COATED ORAL 2 TIMES DAILY
Qty: 14 TABLET | Refills: 0 | Status: SHIPPED | OUTPATIENT
Start: 2024-04-03 | End: 2024-04-10

## 2024-04-04 DIAGNOSIS — I10 PRIMARY HYPERTENSION: ICD-10-CM

## 2024-04-04 DIAGNOSIS — I87.2 VENOUS INSUFFICIENCY: ICD-10-CM

## 2024-04-04 RX ORDER — AMLODIPINE BESYLATE 5 MG/1
5 TABLET ORAL DAILY
Qty: 90 TABLET | Refills: 0 | Status: SHIPPED | OUTPATIENT
Start: 2024-04-04 | End: 2024-05-15 | Stop reason: SDUPTHER

## 2024-04-04 RX ORDER — HYDROCHLOROTHIAZIDE 12.5 MG/1
12.5 TABLET ORAL DAILY
Qty: 90 TABLET | Refills: 0 | Status: SHIPPED | OUTPATIENT
Start: 2024-04-04 | End: 2024-05-15 | Stop reason: SDUPTHER

## 2024-04-21 DIAGNOSIS — F41.1 GAD (GENERALIZED ANXIETY DISORDER): ICD-10-CM

## 2024-04-22 RX ORDER — SERTRALINE HYDROCHLORIDE 50 MG/1
50 TABLET, FILM COATED ORAL DAILY
Qty: 90 TABLET | Refills: 1 | Status: SHIPPED | OUTPATIENT
Start: 2024-04-22 | End: 2024-05-15 | Stop reason: SDUPTHER

## 2024-05-14 ENCOUNTER — APPOINTMENT (OUTPATIENT)
Dept: PRIMARY CARE | Facility: CLINIC | Age: 34
End: 2024-05-14
Payer: COMMERCIAL

## 2024-05-15 ENCOUNTER — OFFICE VISIT (OUTPATIENT)
Dept: PRIMARY CARE | Facility: CLINIC | Age: 34
End: 2024-05-15
Payer: COMMERCIAL

## 2024-05-15 ENCOUNTER — LAB (OUTPATIENT)
Dept: LAB | Facility: LAB | Age: 34
End: 2024-05-15
Payer: COMMERCIAL

## 2024-05-15 VITALS
DIASTOLIC BLOOD PRESSURE: 88 MMHG | HEIGHT: 60 IN | WEIGHT: 261 LBS | BODY MASS INDEX: 51.24 KG/M2 | SYSTOLIC BLOOD PRESSURE: 130 MMHG | HEART RATE: 110 BPM

## 2024-05-15 DIAGNOSIS — I87.2 VENOUS INSUFFICIENCY: ICD-10-CM

## 2024-05-15 DIAGNOSIS — L92.3 TATTOO REACTION: ICD-10-CM

## 2024-05-15 DIAGNOSIS — I10 PRIMARY HYPERTENSION: ICD-10-CM

## 2024-05-15 DIAGNOSIS — M79.10 MYALGIA: ICD-10-CM

## 2024-05-15 DIAGNOSIS — F41.1 GAD (GENERALIZED ANXIETY DISORDER): ICD-10-CM

## 2024-05-15 DIAGNOSIS — R53.83 OTHER FATIGUE: ICD-10-CM

## 2024-05-15 DIAGNOSIS — L92.3 TATTOO REACTION: Primary | ICD-10-CM

## 2024-05-15 LAB
ALBUMIN SERPL BCP-MCNC: 3.9 G/DL (ref 3.4–5)
ALP SERPL-CCNC: 88 U/L (ref 33–110)
ALT SERPL W P-5'-P-CCNC: 14 U/L (ref 7–45)
ANION GAP SERPL CALC-SCNC: 13 MMOL/L (ref 10–20)
AST SERPL W P-5'-P-CCNC: 14 U/L (ref 9–39)
BASOPHILS # BLD AUTO: 0.05 X10*3/UL (ref 0–0.1)
BASOPHILS NFR BLD AUTO: 0.5 %
BILIRUB SERPL-MCNC: 0.4 MG/DL (ref 0–1.2)
BUN SERPL-MCNC: 15 MG/DL (ref 6–23)
CALCIUM SERPL-MCNC: 9.3 MG/DL (ref 8.6–10.3)
CHLORIDE SERPL-SCNC: 102 MMOL/L (ref 98–107)
CK SERPL-CCNC: 68 U/L (ref 0–215)
CO2 SERPL-SCNC: 24 MMOL/L (ref 21–32)
CREAT SERPL-MCNC: 0.7 MG/DL (ref 0.5–1.05)
EGFRCR SERPLBLD CKD-EPI 2021: >90 ML/MIN/1.73M*2
EOSINOPHIL # BLD AUTO: 0.11 X10*3/UL (ref 0–0.7)
EOSINOPHIL NFR BLD AUTO: 1.1 %
ERYTHROCYTE [DISTWIDTH] IN BLOOD BY AUTOMATED COUNT: 17.4 % (ref 11.5–14.5)
ERYTHROCYTE [SEDIMENTATION RATE] IN BLOOD BY WESTERGREN METHOD: 67 MM/H (ref 0–20)
GLUCOSE SERPL-MCNC: 87 MG/DL (ref 74–99)
HCT VFR BLD AUTO: 39.2 % (ref 36–46)
HGB BLD-MCNC: 12 G/DL (ref 12–16)
IMM GRANULOCYTES # BLD AUTO: 0.02 X10*3/UL (ref 0–0.7)
IMM GRANULOCYTES NFR BLD AUTO: 0.2 % (ref 0–0.9)
LYMPHOCYTES # BLD AUTO: 1.96 X10*3/UL (ref 1.2–4.8)
LYMPHOCYTES NFR BLD AUTO: 19.8 %
MCH RBC QN AUTO: 24.4 PG (ref 26–34)
MCHC RBC AUTO-ENTMCNC: 30.6 G/DL (ref 32–36)
MCV RBC AUTO: 80 FL (ref 80–100)
MONOCYTES # BLD AUTO: 0.54 X10*3/UL (ref 0.1–1)
MONOCYTES NFR BLD AUTO: 5.5 %
NEUTROPHILS # BLD AUTO: 7.22 X10*3/UL (ref 1.2–7.7)
NEUTROPHILS NFR BLD AUTO: 72.9 %
NRBC BLD-RTO: 0 /100 WBCS (ref 0–0)
PLATELET # BLD AUTO: 396 X10*3/UL (ref 150–450)
POTASSIUM SERPL-SCNC: 4.1 MMOL/L (ref 3.5–5.3)
PROT SERPL-MCNC: 7.7 G/DL (ref 6.4–8.2)
RBC # BLD AUTO: 4.91 X10*6/UL (ref 4–5.2)
SODIUM SERPL-SCNC: 135 MMOL/L (ref 136–145)
TSH SERPL-ACNC: 2.53 MIU/L (ref 0.44–3.98)
WBC # BLD AUTO: 9.9 X10*3/UL (ref 4.4–11.3)

## 2024-05-15 PROCEDURE — 3075F SYST BP GE 130 - 139MM HG: CPT | Performed by: STUDENT IN AN ORGANIZED HEALTH CARE EDUCATION/TRAINING PROGRAM

## 2024-05-15 PROCEDURE — 82550 ASSAY OF CK (CPK): CPT

## 2024-05-15 PROCEDURE — 3008F BODY MASS INDEX DOCD: CPT | Performed by: STUDENT IN AN ORGANIZED HEALTH CARE EDUCATION/TRAINING PROGRAM

## 2024-05-15 PROCEDURE — 85652 RBC SED RATE AUTOMATED: CPT

## 2024-05-15 PROCEDURE — 3079F DIAST BP 80-89 MM HG: CPT | Performed by: STUDENT IN AN ORGANIZED HEALTH CARE EDUCATION/TRAINING PROGRAM

## 2024-05-15 PROCEDURE — 36415 COLL VENOUS BLD VENIPUNCTURE: CPT

## 2024-05-15 PROCEDURE — 1036F TOBACCO NON-USER: CPT | Performed by: STUDENT IN AN ORGANIZED HEALTH CARE EDUCATION/TRAINING PROGRAM

## 2024-05-15 PROCEDURE — 86038 ANTINUCLEAR ANTIBODIES: CPT

## 2024-05-15 PROCEDURE — 99214 OFFICE O/P EST MOD 30 MIN: CPT | Performed by: STUDENT IN AN ORGANIZED HEALTH CARE EDUCATION/TRAINING PROGRAM

## 2024-05-15 PROCEDURE — 84443 ASSAY THYROID STIM HORMONE: CPT

## 2024-05-15 PROCEDURE — 80053 COMPREHEN METABOLIC PANEL: CPT

## 2024-05-15 PROCEDURE — 85025 COMPLETE CBC W/AUTO DIFF WBC: CPT

## 2024-05-15 RX ORDER — NAPROXEN 500 MG/1
500 TABLET ORAL 2 TIMES DAILY PRN
Qty: 60 TABLET | Refills: 0 | Status: SHIPPED | OUTPATIENT
Start: 2024-05-15 | End: 2024-08-13

## 2024-05-15 RX ORDER — SERTRALINE HYDROCHLORIDE 50 MG/1
50 TABLET, FILM COATED ORAL DAILY
Qty: 90 TABLET | Refills: 1 | Status: SHIPPED | OUTPATIENT
Start: 2024-05-15 | End: 2024-11-11

## 2024-05-15 RX ORDER — HYDROCHLOROTHIAZIDE 12.5 MG/1
12.5 TABLET ORAL DAILY
Qty: 90 TABLET | Refills: 1 | Status: SHIPPED | OUTPATIENT
Start: 2024-05-15 | End: 2024-11-11

## 2024-05-15 RX ORDER — AMLODIPINE BESYLATE 5 MG/1
5 TABLET ORAL DAILY
Qty: 90 TABLET | Refills: 1 | Status: SHIPPED | OUTPATIENT
Start: 2024-05-15 | End: 2024-11-11

## 2024-05-15 ASSESSMENT — PATIENT HEALTH QUESTIONNAIRE - PHQ9
SUM OF ALL RESPONSES TO PHQ9 QUESTIONS 1 AND 2: 0
2. FEELING DOWN, DEPRESSED OR HOPELESS: NOT AT ALL
1. LITTLE INTEREST OR PLEASURE IN DOING THINGS: NOT AT ALL

## 2024-05-15 NOTE — PROGRESS NOTES
Casa Verdguo is a 34 y.o. year old female presenting for Follow-up       HPI:  Fatigue, myalgias, tattoo changes  Over the past 2 to 3 weeks, she has developed persistent myalgias, fatigue and has noticed some recent changes in 2 of her tattoos where the ink is bubbling up.  She is unsure if these are connected, but they all started around the same time.  She just feels like her muscles are just really achy even though she is not doing anything.  She feels like she could just nap all day long when she feels like she is getting an appropriate amount of sleep.  No upper respiratory symptoms, signs of bleeding, chest pain, shortness of breath, wheezing, GI symptoms.    Hypertension  Currently taking amlodipine 5 mg and hydrochlorothiazide 12.5 mg daily for blood pressure control.  Also controlling her venous insufficiency swelling in her feet and ankles.  Doing well on these without side effects.  Requesting refills today.    Anxiety  Currently taking sertraline 50 mg daily for anxiety control.  Doing well on this medication without side effects.  She believes her anxiety is controlled on her current dose and requesting refill today.    ROS:   All pertinent positive symptoms are included in history of present illness.    All other systems have been reviewed and are negative and noncontributory to this patient's current ailments.    Current Outpatient Medications   Medication Sig Dispense Refill    amLODIPine (Norvasc) 5 mg tablet TAKE 1 TABLET (5 MG) BY MOUTH ONCE DAILY 90 tablet 0    hydroCHLOROthiazide (Microzide) 12.5 mg tablet TAKE 1 TABLET (12.5 MG) BY MOUTH ONCE DAILY 90 tablet 0    sertraline (Zoloft) 50 mg tablet TAKE 1 TABLET (50 MG) BY MOUTH ONCE DAILY 90 tablet 1    fluticasone (Flonase) 50 mcg/actuation nasal spray Administer 2 sprays into each nostril 2 times a day as needed for rhinitis. Shake gently. Before first use, prime pump. After use, clean tip and replace cap. (Patient not taking: Reported on  5/15/2024) 9.9 mL 0    naproxen (Naprosyn) 500 mg tablet Take 1 tablet (500 mg) by mouth 2 times a day as needed for mild pain (1 - 3) (pain). 60 tablet 0     No current facility-administered medications for this visit.       OBJECTIVE  Visit Vitals  /88   Pulse 110   Ht 1.524 m (5')   Wt 118 kg (261 lb)   BMI 50.97 kg/m²   Smoking Status Former   BSA 2.24 m²        Physical Exam:  GENERAL: Alert, oriented, pleasant, in no acute distress  HEENT: Head normocephalic, atraumatic  CV: Heart with regular rate and rhythm, normal S1/S2, no murmurs; normal peripheral pulses- radial and dorsalis pedis palpable  LUNGS: CTAB without wheezing, rhonchi or rales; good respiratory effort, no increased work of breathing  EXTREMITIES: no edema, no cyanosis  PSYCH: Appropriate mood and affect  SKIN: bubbled up appearance in one area of a tattoo on her chest and 3 bubbled up areas on a tattoo on her upper back/lower neck    Assessment/Plan   Diagnoses and all orders for this visit:  Tattoo reaction/Myalgia/Other fatigue  Will check for inflammatory and autoimmune processes at this time as well as thyroid disorder.  Recommend seeing dermatology as soon as possible about the tattoo changes as I am not sure what is going on there at this time.  -     CBC and Auto Differential; Future  -     Sedimentation Rate; Future  -     Comprehensive metabolic panel; Future  -     TSH with reflex to Free T4 if abnormal; Future  -     LYNDSAY with Reflex to PEDRO; Future  -     Referral to Dermatology  -     CK; Future  -     naproxen (Naprosyn) 500 mg tablet; Take 1 tablet (500 mg) by mouth 2 times a day as needed for mild pain (1 - 3) (pain).  Venous insufficiency  Controlled with the hydrochlorothiazide, refills provided  -     hydroCHLOROthiazide (Microzide) 12.5 mg tablet; Take 1 tablet (12.5 mg) by mouth once daily.  Primary hypertension  Controlled on current medications, refills provided  -     hydroCHLOROthiazide (Microzide) 12.5 mg tablet;  Take 1 tablet (12.5 mg) by mouth once daily.  -     amLODIPine (Norvasc) 5 mg tablet; Take 1 tablet (5 mg) by mouth once daily.  PARDEEP (generalized anxiety disorder)  Controlled on current medication, refills provided  -     sertraline (Zoloft) 50 mg tablet; Take 1 tablet (50 mg) by mouth once daily.    Please call the office with any new or worsening symptoms

## 2024-05-21 LAB — ANA SER QL HEP2 SUBST: NEGATIVE

## 2024-05-23 ENCOUNTER — PATIENT MESSAGE (OUTPATIENT)
Dept: PRIMARY CARE | Facility: CLINIC | Age: 34
End: 2024-05-23
Payer: COMMERCIAL

## 2024-05-23 DIAGNOSIS — M79.89 SWELLING OF BOTH HANDS: Primary | ICD-10-CM

## 2024-05-23 RX ORDER — PREDNISONE 20 MG/1
40 TABLET ORAL DAILY
Qty: 10 TABLET | Refills: 0 | Status: SHIPPED | OUTPATIENT
Start: 2024-05-23 | End: 2024-05-28

## 2024-05-31 ENCOUNTER — PATIENT MESSAGE (OUTPATIENT)
Dept: PRIMARY CARE | Facility: CLINIC | Age: 34
End: 2024-05-31
Payer: COMMERCIAL

## 2024-05-31 DIAGNOSIS — M25.50 POLYARTHRALGIA: Primary | ICD-10-CM

## 2024-06-11 ENCOUNTER — PATIENT MESSAGE (OUTPATIENT)
Dept: PRIMARY CARE | Facility: CLINIC | Age: 34
End: 2024-06-11
Payer: COMMERCIAL

## 2024-06-11 DIAGNOSIS — D86.3 SARCOIDOSIS OF SKIN (CMS-HCC): Primary | ICD-10-CM

## 2024-07-15 ENCOUNTER — HOSPITAL ENCOUNTER (OUTPATIENT)
Dept: RADIOLOGY | Facility: HOSPITAL | Age: 34
Discharge: HOME | End: 2024-07-15
Payer: COMMERCIAL

## 2024-07-15 DIAGNOSIS — D86.3 SARCOIDOSIS OF SKIN (CMS-HCC): ICD-10-CM

## 2024-07-15 PROCEDURE — 71046 X-RAY EXAM CHEST 2 VIEWS: CPT | Performed by: RADIOLOGY

## 2024-07-15 PROCEDURE — 71260 CT THORAX DX C+: CPT

## 2024-07-15 PROCEDURE — 71260 CT THORAX DX C+: CPT | Performed by: RADIOLOGY

## 2024-07-15 PROCEDURE — 2550000001 HC RX 255 CONTRASTS: Performed by: STUDENT IN AN ORGANIZED HEALTH CARE EDUCATION/TRAINING PROGRAM

## 2024-07-15 PROCEDURE — 71046 X-RAY EXAM CHEST 2 VIEWS: CPT

## 2024-07-17 ENCOUNTER — APPOINTMENT (OUTPATIENT)
Dept: RHEUMATOLOGY | Facility: CLINIC | Age: 34
End: 2024-07-17
Payer: COMMERCIAL

## 2024-07-19 NOTE — PROGRESS NOTES
"Subjective   Patient ID: Casa Verdugo is a 34 y.o. female who presents for New Patient Visit (New patient visit, referred by PCP Dr. Mcgovern. Patient complains of joint pain, fatigue, left leg/thigh pain, and inflammation. Patient would like to discuss Sarcoidosis.).    HPI  Consult    \"Joint pain/inflammation\"  Seen in May by PCP: suggested Derm consult for tatoo changes (ink bubbling up), myalgias and fatigue     Joint pain  Fatigue  Dx sarcoidosis  Derm bx bubbling tatoo   Chest xray and ct chest negative   No meds     Dry eye and mouth few mos ago     Covid jan 24   Then L thigh \"nerve pain\"  Fatigue bad  No joint swelling   Stiff in am 3 hours   Ankles  L mcp  Whole back  Toes    No better with naprosyn    Derm [facility] in [place]     ROS   Latest Reference Range & Units 05/15/24 10:06   Sed Rate 0 - 20 mm/h 67 (H)   LYNDSAY Negative  Negative   (H): Data is abnormally high    Current Outpatient Medications   Medication Sig Dispense Refill    amLODIPine (Norvasc) 5 mg tablet Take 1 tablet (5 mg) by mouth once daily. 90 tablet 1    hydroCHLOROthiazide (Microzide) 12.5 mg tablet Take 1 tablet (12.5 mg) by mouth once daily. 90 tablet 1    sertraline (Zoloft) 50 mg tablet Take 1 tablet (50 mg) by mouth once daily. 90 tablet 1    celecoxib (CeleBREX) 200 mg capsule Take 1 capsule (200 mg) by mouth 2 times a day. 60 capsule 1     No current facility-administered medications for this visit.         has a past medical history of Encounter for gynecological examination (general) (routine) without abnormal findings (09/08/2020), Lymphedema, not elsewhere classified, Personal history of other diseases of the circulatory system, Personal history of other diseases of the digestive system, Personal history of other diseases of the musculoskeletal system and connective tissue, and Unspecified open wound of unspecified finger without damage to nail, initial encounter (09/06/2019).   Past Surgical History:   Procedure " Laterality Date    OTHER SURGICAL HISTORY  2014    Dental Surgery    OTHER SURGICAL HISTORY  2020     section    OTHER SURGICAL HISTORY  10/23/2014    Stab Phlebectomy Of Varicose Veins      Social History     Tobacco Use    Smoking status: Former     Types: Cigarettes    Smokeless tobacco: Never      family history is not on file.  Pain Management Panel          Latest Ref Rng & Units 10/17/2022   Pain Management Panel   Amphetamine Screen, Urine NEGATIVE PRESUMPTIVE NEGATIVE    Barbiturate Screen, Urine NEGATIVE PRESUMPTIVE NEGATIVE    Fentanyl Screen, Urine NEGATIVE PRESUMPTIVE NEGATIVE    Methadone Screen, Urine NEGATIVE PRESUMPTIVE NEGATIVE       Details                    Vitals:    24 1359   BP: 133/80   Pulse: 71   SpO2: 99%     Lab Results   Component Value Date    SEDRATE 67 (H) 05/15/2024    TSH 2.53 05/15/2024       PHYSICAL EXAM      NODES   HEART  LUNGS  ABDOMEN   VASCULAR  NEURO   SKIN  JOINTS     PLAN  Diagnoses and all orders for this visit:  Cutaneous sarcoidosis (CMS-[facility])  -     Angiotensin Converting Enzyme; Future  -     Citrulline Antibody, IgG; Future  -     C-Reactive Protein; Future  -     Rheumatoid Factor; Future  -     Sedimentation Rate; Future  -     celecoxib (CeleBREX) 200 mg capsule; Take 1 capsule (200 mg) by mouth 2 times a day.  Polyarthralgia  -     Referral to Rheumatology  -     Angiotensin Converting Enzyme; Future  -     Citrulline Antibody, IgG; Future  -     C-Reactive Protein; Future  -     Rheumatoid Factor; Future  -     Sedimentation Rate; Future  -     celecoxib (CeleBREX) 200 mg capsule; Take 1 capsule (200 mg) by mouth 2 times a day.  Patient with biopsy-proven cutaneous sarcoidosis although the pathology appears not to be in the electronic record or in the dermatology note.    Patient stated that in 2024 she developed significant COVID with shortness of breath but not severe enough to go to the emergency room    Several months ago  she noticed bubbling over the rim of her chest tattoo and this was biopsied and was consistent with sarcoidosis of the skin  Chest x-ray and CT were normal  She does not have any signs or symptoms of any systemic manifestations of sarcoid    She now has increasing fatigue joint pain morning stiffness dry eyes and dry mouth over the past several months.    Her CMP CBC are normal sedimentation rate elevated and LYNDSAY negative    All of this appears to be sarcoidosis of the skin without systemic manifestations  Alternative diagnoses are still possible including rheumatoid arthritis Sjogren syndrome excetra    I have ordered rheumatoid factor CCP sed rate and C-reactive protein as well as an ACE level.    I have discontinued her naproxen as it is not helpful and sent prescription for Celebrex 200 mg daily or twice daily.

## 2024-07-24 ENCOUNTER — APPOINTMENT (OUTPATIENT)
Dept: RHEUMATOLOGY | Facility: CLINIC | Age: 34
End: 2024-07-24
Payer: COMMERCIAL

## 2024-07-24 ENCOUNTER — LAB (OUTPATIENT)
Dept: LAB | Facility: LAB | Age: 34
End: 2024-07-24
Payer: COMMERCIAL

## 2024-07-24 VITALS
SYSTOLIC BLOOD PRESSURE: 133 MMHG | WEIGHT: 260 LBS | HEART RATE: 71 BPM | OXYGEN SATURATION: 99 % | DIASTOLIC BLOOD PRESSURE: 80 MMHG | BODY MASS INDEX: 51.04 KG/M2 | HEIGHT: 60 IN

## 2024-07-24 DIAGNOSIS — D86.3 CUTANEOUS SARCOIDOSIS (CMS-HCC): Primary | ICD-10-CM

## 2024-07-24 DIAGNOSIS — M25.50 POLYARTHRALGIA: ICD-10-CM

## 2024-07-24 DIAGNOSIS — D86.3 CUTANEOUS SARCOIDOSIS (CMS-HCC): ICD-10-CM

## 2024-07-24 PROCEDURE — 3008F BODY MASS INDEX DOCD: CPT | Performed by: INTERNAL MEDICINE

## 2024-07-24 PROCEDURE — 3079F DIAST BP 80-89 MM HG: CPT | Performed by: INTERNAL MEDICINE

## 2024-07-24 PROCEDURE — 86140 C-REACTIVE PROTEIN: CPT

## 2024-07-24 PROCEDURE — 99204 OFFICE O/P NEW MOD 45 MIN: CPT | Performed by: INTERNAL MEDICINE

## 2024-07-24 PROCEDURE — 86431 RHEUMATOID FACTOR QUANT: CPT

## 2024-07-24 PROCEDURE — 82164 ANGIOTENSIN I ENZYME TEST: CPT

## 2024-07-24 PROCEDURE — 86200 CCP ANTIBODY: CPT

## 2024-07-24 PROCEDURE — 36415 COLL VENOUS BLD VENIPUNCTURE: CPT

## 2024-07-24 PROCEDURE — 1036F TOBACCO NON-USER: CPT | Performed by: INTERNAL MEDICINE

## 2024-07-24 PROCEDURE — 85652 RBC SED RATE AUTOMATED: CPT

## 2024-07-24 PROCEDURE — 3075F SYST BP GE 130 - 139MM HG: CPT | Performed by: INTERNAL MEDICINE

## 2024-07-24 RX ORDER — CELECOXIB 200 MG/1
200 CAPSULE ORAL 2 TIMES DAILY
Qty: 60 CAPSULE | Refills: 1 | Status: SHIPPED | OUTPATIENT
Start: 2024-07-24

## 2024-07-24 ASSESSMENT — PAIN SCALES - GENERAL: PAINLEVEL: 6

## 2024-07-25 LAB
CCP IGG SERPL-ACNC: <1 U/ML
CRP SERPL-MCNC: 2.39 MG/DL
ERYTHROCYTE [SEDIMENTATION RATE] IN BLOOD BY WESTERGREN METHOD: 41 MM/H (ref 0–20)
RHEUMATOID FACT SER NEPH-ACNC: <10 IU/ML (ref 0–15)

## 2024-07-27 LAB — ACE SERPL-CCNC: 21 U/L (ref 16–85)

## 2024-09-18 PROBLEM — D86.3 CUTANEOUS SARCOIDOSIS (CMS-HCC): Status: ACTIVE | Noted: 2024-09-18

## 2024-09-18 NOTE — PROGRESS NOTES
"Subjective   Patient ID: Casa Verdugo is a 34 y.o. female who presents for Follow-up (Test results ).    HPI  FU last seen July 2024    Cutaneous Sarcoid   WSR and CRP eleveted  LYNDSAY-  RF and CCP both -    Tired  Achy  Muscle aches   Sarcoid skin is doing ok     \"Joint pain/inflammation\"  Seen in May by PCP: suggested Derm consult for tatoo changes (ink bubbling up), myalgias and fatigue     Joint pain  Fatigue  Dx sarcoidosis  Derm bx bubbling tatoo   Chest xray and ct chest negative   No meds     Dry eye and mouth few mos ago     Covid jan 24   Then L thigh \"nerve pain\"  Fatigue bad  No joint swelling   Stiff in am 3 hours   Ankles  L mcp  Whole back  Toes    No better with naprosyn    Derm [facility] in [place]     ROS   Latest Reference Range & Units 05/15/24 10:06   Sed Rate 0 - 20 mm/h 67 (H)   LYNDSAY Negative  Negative   (H): Data is abnormally high    Current Outpatient Medications   Medication Sig Dispense Refill    amLODIPine (Norvasc) 5 mg tablet Take 1 tablet (5 mg) by mouth once daily. 90 tablet 1    hydroCHLOROthiazide (Microzide) 12.5 mg tablet Take 1 tablet (12.5 mg) by mouth once daily. 90 tablet 1    sertraline (Zoloft) 50 mg tablet Take 1 tablet (50 mg) by mouth once daily. 90 tablet 1    celecoxib (CeleBREX) 200 mg capsule Take 1 capsule (200 mg) by mouth 2 times a day. 60 capsule 5    hydroxychloroquine (Plaquenil) 200 mg tablet Take 2 tablets (400 mg) by mouth 2 times a day. 120 tablet 5     No current facility-administered medications for this visit.         has a past medical history of Encounter for gynecological examination (general) (routine) without abnormal findings (09/08/2020), Lymphedema, not elsewhere classified, Personal history of other diseases of the circulatory system, Personal history of other diseases of the digestive system, Personal history of other diseases of the musculoskeletal system and connective tissue, and Unspecified open wound of unspecified finger without damage to " nail, initial encounter (2019).   Past Surgical History:   Procedure Laterality Date    OTHER SURGICAL HISTORY  2014    Dental Surgery    OTHER SURGICAL HISTORY  2020     section    OTHER SURGICAL HISTORY  10/23/2014    Stab Phlebectomy Of Varicose Veins      Social History     Tobacco Use    Smoking status: Former     Types: Cigarettes    Smokeless tobacco: Never      family history is not on file.  Pain Management Panel          Latest Ref Rng & Units 10/17/2022   Pain Management Panel   Amphetamine Screen, Urine NEGATIVE PRESUMPTIVE NEGATIVE    Barbiturate Screen, Urine NEGATIVE PRESUMPTIVE NEGATIVE    Fentanyl Screen, Urine NEGATIVE PRESUMPTIVE NEGATIVE    Methadone Screen, Urine NEGATIVE PRESUMPTIVE NEGATIVE       Details                    Vitals:    24 1250   BP: 119/69   Pulse: 65   Resp: 18       Lab Results   Component Value Date    RF <10 2024    SEDRATE 41 (H) 2024    CRP 2.39 (H) 2024    TSH 2.53 05/15/2024       PHYSICAL EXAM      NODES   HEART  LUNGS  ABDOMEN   VASCULAR  NEURO   SKIN  JOINTS     PLAN  Diagnoses and all orders for this visit:  Cutaneous sarcoidosis (CMS-HCC)  -     celecoxib (CeleBREX) 200 mg capsule; Take 1 capsule (200 mg) by mouth 2 times a day.  -     hydroxychloroquine (Plaquenil) 200 mg tablet; Take 2 tablets (400 mg) by mouth 2 times a day.  Polyarthralgia  -     celecoxib (CeleBREX) 200 mg capsule; Take 1 capsule (200 mg) by mouth 2 times a day.  -     hydroxychloroquine (Plaquenil) 200 mg tablet; Take 2 tablets (400 mg) by mouth 2 times a day.  Myalgia  -     celecoxib (CeleBREX) 200 mg capsule; Take 1 capsule (200 mg) by mouth 2 times a day.  -     hydroxychloroquine (Plaquenil) 200 mg tablet; Take 2 tablets (400 mg) by mouth 2 times a day.  Other fatigue  -     celecoxib (CeleBREX) 200 mg capsule; Take 1 capsule (200 mg) by mouth 2 times a day.  -     hydroxychloroquine (Plaquenil) 200 mg tablet; Take 2 tablets (400 mg) by  mouth 2 times a day.    Patient with biopsy-proven cutaneous sarcoidosis although the pathology appears not to be in the electronic record or in the dermatology note.    Patient stated that in January 2024 she developed significant COVID with shortness of breath but not severe enough to go to the emergency room    Several months ago she noticed bubbling over the rim of her chest tattoo and this was biopsied and was consistent with sarcoidosis of the skin  Chest x-ray and CT were normal  She does not have any signs or symptoms of any systemic manifestations of sarcoid    She now has increasing fatigue joint pain morning stiffness dry eyes and dry mouth over the past several months.    Increased APR     All of this appears to be sarcoidosis of the skin without systemic manifestations    Celebrex helping maximally  Will add plaquenil 200 bid  Opth consult    See 6 mos

## 2024-09-20 ENCOUNTER — APPOINTMENT (OUTPATIENT)
Dept: RHEUMATOLOGY | Facility: CLINIC | Age: 34
End: 2024-09-20
Payer: COMMERCIAL

## 2024-09-20 VITALS — SYSTOLIC BLOOD PRESSURE: 119 MMHG | HEART RATE: 65 BPM | RESPIRATION RATE: 18 BRPM | DIASTOLIC BLOOD PRESSURE: 69 MMHG

## 2024-09-20 DIAGNOSIS — R53.83 OTHER FATIGUE: ICD-10-CM

## 2024-09-20 DIAGNOSIS — M79.10 MYALGIA: ICD-10-CM

## 2024-09-20 DIAGNOSIS — Z79.899 LONG-TERM USE OF PLAQUENIL: ICD-10-CM

## 2024-09-20 DIAGNOSIS — D86.3 CUTANEOUS SARCOIDOSIS (CMS-HCC): Primary | ICD-10-CM

## 2024-09-20 DIAGNOSIS — M25.50 POLYARTHRALGIA: ICD-10-CM

## 2024-09-20 PROCEDURE — 3074F SYST BP LT 130 MM HG: CPT | Performed by: INTERNAL MEDICINE

## 2024-09-20 PROCEDURE — 99214 OFFICE O/P EST MOD 30 MIN: CPT | Performed by: INTERNAL MEDICINE

## 2024-09-20 PROCEDURE — 3078F DIAST BP <80 MM HG: CPT | Performed by: INTERNAL MEDICINE

## 2024-09-20 RX ORDER — HYDROXYCHLOROQUINE SULFATE 200 MG/1
400 TABLET, FILM COATED ORAL 2 TIMES DAILY
Qty: 120 TABLET | Refills: 5 | Status: SHIPPED | OUTPATIENT
Start: 2024-09-20 | End: 2025-03-19

## 2024-09-20 RX ORDER — CELECOXIB 200 MG/1
200 CAPSULE ORAL 2 TIMES DAILY
Qty: 60 CAPSULE | Refills: 5 | Status: SHIPPED | OUTPATIENT
Start: 2024-09-20

## 2024-11-08 ENCOUNTER — APPOINTMENT (OUTPATIENT)
Dept: PRIMARY CARE | Facility: CLINIC | Age: 34
End: 2024-11-08
Payer: COMMERCIAL

## 2024-11-08 ENCOUNTER — LAB (OUTPATIENT)
Dept: LAB | Facility: LAB | Age: 34
End: 2024-11-08
Payer: COMMERCIAL

## 2024-11-08 VITALS
HEIGHT: 60 IN | DIASTOLIC BLOOD PRESSURE: 86 MMHG | SYSTOLIC BLOOD PRESSURE: 126 MMHG | BODY MASS INDEX: 51.83 KG/M2 | HEART RATE: 90 BPM | WEIGHT: 264 LBS

## 2024-11-08 DIAGNOSIS — M25.50 POLYARTHRALGIA: ICD-10-CM

## 2024-11-08 DIAGNOSIS — I10 PRIMARY HYPERTENSION: ICD-10-CM

## 2024-11-08 DIAGNOSIS — Z23 ENCOUNTER FOR IMMUNIZATION: ICD-10-CM

## 2024-11-08 DIAGNOSIS — I87.2 VENOUS INSUFFICIENCY: ICD-10-CM

## 2024-11-08 DIAGNOSIS — D86.3 CUTANEOUS SARCOIDOSIS (CMS-HCC): ICD-10-CM

## 2024-11-08 DIAGNOSIS — Z00.00 ENCOUNTER FOR PREVENTIVE HEALTH EXAMINATION: Primary | ICD-10-CM

## 2024-11-08 DIAGNOSIS — J34.89 INTERNAL NASAL LESION: ICD-10-CM

## 2024-11-08 DIAGNOSIS — F41.1 GAD (GENERALIZED ANXIETY DISORDER): ICD-10-CM

## 2024-11-08 DIAGNOSIS — Z00.00 ENCOUNTER FOR PREVENTIVE HEALTH EXAMINATION: ICD-10-CM

## 2024-11-08 LAB
ALBUMIN SERPL BCP-MCNC: 3.9 G/DL (ref 3.4–5)
ALP SERPL-CCNC: 85 U/L (ref 33–110)
ALT SERPL W P-5'-P-CCNC: 13 U/L (ref 7–45)
ANION GAP SERPL CALC-SCNC: 10 MMOL/L (ref 10–20)
AST SERPL W P-5'-P-CCNC: 15 U/L (ref 9–39)
BILIRUB SERPL-MCNC: 0.3 MG/DL (ref 0–1.2)
BUN SERPL-MCNC: 11 MG/DL (ref 6–23)
CALCIUM SERPL-MCNC: 8.6 MG/DL (ref 8.6–10.3)
CHLORIDE SERPL-SCNC: 103 MMOL/L (ref 98–107)
CHOLEST SERPL-MCNC: 181 MG/DL (ref 0–199)
CHOLESTEROL/HDL RATIO: 3.9
CO2 SERPL-SCNC: 29 MMOL/L (ref 21–32)
CREAT SERPL-MCNC: 0.71 MG/DL (ref 0.5–1.05)
EGFRCR SERPLBLD CKD-EPI 2021: >90 ML/MIN/1.73M*2
ERYTHROCYTE [DISTWIDTH] IN BLOOD BY AUTOMATED COUNT: 17.4 % (ref 11.5–14.5)
GLUCOSE SERPL-MCNC: 91 MG/DL (ref 74–99)
HCT VFR BLD AUTO: 39.6 % (ref 36–46)
HDLC SERPL-MCNC: 46.6 MG/DL
HGB BLD-MCNC: 12 G/DL (ref 12–16)
LDLC SERPL CALC-MCNC: 106 MG/DL
MCH RBC QN AUTO: 24 PG (ref 26–34)
MCHC RBC AUTO-ENTMCNC: 30.3 G/DL (ref 32–36)
MCV RBC AUTO: 79 FL (ref 80–100)
NON HDL CHOLESTEROL: 134 MG/DL (ref 0–149)
NRBC BLD-RTO: 0 /100 WBCS (ref 0–0)
PLATELET # BLD AUTO: 360 X10*3/UL (ref 150–450)
POTASSIUM SERPL-SCNC: 4.1 MMOL/L (ref 3.5–5.3)
PROT SERPL-MCNC: 7.1 G/DL (ref 6.4–8.2)
RBC # BLD AUTO: 4.99 X10*6/UL (ref 4–5.2)
SODIUM SERPL-SCNC: 138 MMOL/L (ref 136–145)
TRIGL SERPL-MCNC: 143 MG/DL (ref 0–149)
TSH SERPL-ACNC: 2.33 MIU/L (ref 0.44–3.98)
VLDL: 29 MG/DL (ref 0–40)
WBC # BLD AUTO: 8.8 X10*3/UL (ref 4.4–11.3)

## 2024-11-08 PROCEDURE — 99395 PREV VISIT EST AGE 18-39: CPT | Performed by: STUDENT IN AN ORGANIZED HEALTH CARE EDUCATION/TRAINING PROGRAM

## 2024-11-08 PROCEDURE — 80061 LIPID PANEL: CPT

## 2024-11-08 PROCEDURE — 90656 IIV3 VACC NO PRSV 0.5 ML IM: CPT | Performed by: STUDENT IN AN ORGANIZED HEALTH CARE EDUCATION/TRAINING PROGRAM

## 2024-11-08 PROCEDURE — 3079F DIAST BP 80-89 MM HG: CPT | Performed by: STUDENT IN AN ORGANIZED HEALTH CARE EDUCATION/TRAINING PROGRAM

## 2024-11-08 PROCEDURE — 1036F TOBACCO NON-USER: CPT | Performed by: STUDENT IN AN ORGANIZED HEALTH CARE EDUCATION/TRAINING PROGRAM

## 2024-11-08 PROCEDURE — 3074F SYST BP LT 130 MM HG: CPT | Performed by: STUDENT IN AN ORGANIZED HEALTH CARE EDUCATION/TRAINING PROGRAM

## 2024-11-08 PROCEDURE — 85027 COMPLETE CBC AUTOMATED: CPT

## 2024-11-08 PROCEDURE — 90471 IMMUNIZATION ADMIN: CPT | Performed by: STUDENT IN AN ORGANIZED HEALTH CARE EDUCATION/TRAINING PROGRAM

## 2024-11-08 PROCEDURE — 99214 OFFICE O/P EST MOD 30 MIN: CPT | Performed by: STUDENT IN AN ORGANIZED HEALTH CARE EDUCATION/TRAINING PROGRAM

## 2024-11-08 PROCEDURE — 36415 COLL VENOUS BLD VENIPUNCTURE: CPT

## 2024-11-08 PROCEDURE — 83036 HEMOGLOBIN GLYCOSYLATED A1C: CPT

## 2024-11-08 PROCEDURE — 80053 COMPREHEN METABOLIC PANEL: CPT

## 2024-11-08 PROCEDURE — 3008F BODY MASS INDEX DOCD: CPT | Performed by: STUDENT IN AN ORGANIZED HEALTH CARE EDUCATION/TRAINING PROGRAM

## 2024-11-08 PROCEDURE — 84443 ASSAY THYROID STIM HORMONE: CPT

## 2024-11-08 RX ORDER — SERTRALINE HYDROCHLORIDE 50 MG/1
50 TABLET, FILM COATED ORAL DAILY
Qty: 90 TABLET | Refills: 1 | Status: SHIPPED | OUTPATIENT
Start: 2024-11-08 | End: 2025-05-07

## 2024-11-08 RX ORDER — MUPIROCIN 20 MG/G
OINTMENT TOPICAL 3 TIMES DAILY
Qty: 22 G | Refills: 0 | Status: SHIPPED | OUTPATIENT
Start: 2024-11-08 | End: 2024-11-18

## 2024-11-08 RX ORDER — AMLODIPINE BESYLATE 5 MG/1
5 TABLET ORAL DAILY
Qty: 90 TABLET | Refills: 1 | Status: SHIPPED | OUTPATIENT
Start: 2024-11-08 | End: 2025-05-07

## 2024-11-08 RX ORDER — HYDROCHLOROTHIAZIDE 12.5 MG/1
12.5 TABLET ORAL DAILY
Qty: 90 TABLET | Refills: 1 | Status: SHIPPED | OUTPATIENT
Start: 2024-11-08 | End: 2025-05-07

## 2024-11-08 RX ORDER — CEPHALEXIN 500 MG/1
500 CAPSULE ORAL 3 TIMES DAILY
Qty: 30 CAPSULE | Refills: 0 | Status: SHIPPED | OUTPATIENT
Start: 2024-11-08 | End: 2024-11-18

## 2024-11-08 ASSESSMENT — PROMIS GLOBAL HEALTH SCALE
RATE_AVERAGE_FATIGUE: SEVERE
RATE_PHYSICAL_HEALTH: FAIR
CARRYOUT_SOCIAL_ACTIVITIES: VERY GOOD
RATE_AVERAGE_PAIN: 4
RATE_QUALITY_OF_LIFE: GOOD
EMOTIONAL_PROBLEMS: SOMETIMES
RATE_MENTAL_HEALTH: GOOD
CARRYOUT_PHYSICAL_ACTIVITIES: A LITTLE
RATE_GENERAL_HEALTH: FAIR
RATE_SOCIAL_SATISFACTION: VERY GOOD

## 2024-11-08 NOTE — PROGRESS NOTES
"Subjective   Patient ID: Casa Verdugo is a 34 y.o. female who presents for Follow-up.    Past Medical, Surgical, and Family History reviewed and updated in chart.    Reviewed all medications by prescribing practitioner or clinical pharmacist (such as prescriptions, OTCs, herbal therapies and supplements) and documented in the medical record.    HPI  1.  Hypertension   Currently taking amlodipine 5 mg and hydrochlorothiazide 12.5 mg daily for blood pressure control.  Also controlling her venous insufficiency swelling in her feet and ankles.  Doing well on these without side effects.  Requesting refills today.    2. Anxiety  Currently taking sertraline 50 mg daily for anxiety control.  Doing well on this medication without side effects.  She believes her anxiety is controlled on her current dose and requesting refill today.    3. Right Nasal Lesion   A 34 year old female is presenting today with a swollen right nostril.  For the past week and a half, she has noticed pain and swelling of her nose and under eye.  She describes the pain as pressure that can become severe.  She has noticed difficulty blowing her nose, and has seen some blood on her tissues.  She has tried saline nasal spray and ointment without relief.      She notes accompanying throat discomfort, which she states \"feels like a golf ball in my throat\".  She has noticed difficulty swallowing, but denies food getting stuck.  She denies weight loss but has noticed some night sweats and fatigue.      Review of Systems  All pertinent positive symptoms are included in the history of present illness.    All other systems have been reviewed and are negative and noncontributory to this patient's current ailments.    Past Medical History:   Diagnosis Date    Encounter for gynecological examination (general) (routine) without abnormal findings 09/08/2020    Women's annual routine gynecological examination    Lymphedema, not elsewhere classified     Lymphedema of " left lower extremity    Personal history of other diseases of the circulatory system     History of hypertension    Personal history of other diseases of the digestive system     History of abdominal hernia    Personal history of other diseases of the musculoskeletal system and connective tissue     History of herniated intervertebral disc    Unspecified open wound of unspecified finger without damage to nail, initial encounter 2019    Avulsion of skin of finger, initial encounter     Past Surgical History:   Procedure Laterality Date    OTHER SURGICAL HISTORY  2014    Dental Surgery    OTHER SURGICAL HISTORY  2020     section    OTHER SURGICAL HISTORY  10/23/2014    Stab Phlebectomy Of Varicose Veins     Social History     Tobacco Use    Smoking status: Former     Types: Cigarettes    Smokeless tobacco: Never     No family history on file.  Immunization History   Administered Date(s) Administered    Flu vaccine (IIV4), preservative free *Check age/dose* 10/23/2019, 10/17/2022, 10/04/2023    Flu vaccine, trivalent, preservative free, age 6 months and greater (Fluarix/Fluzone/Flulaval) 2024    Influenza, injectable, quadrivalent 10/19/2020, 2022    Influenza, seasonal, injectable 10/24/2016, 2018    Pfizer Purple Cap SARS-CoV-2 2021, 2021    Rho(D)-IG IM 2023    Tdap vaccine, age 7 year and older (BOOSTRIX, ADACEL) 2018, 2023     Current Outpatient Medications   Medication Instructions    amLODIPine (NORVASC) 5 mg, oral, Daily    celecoxib (CELEBREX) 200 mg, oral, 2 times daily    cephalexin (KEFLEX) 500 mg, oral, 3 times daily    hydroCHLOROthiazide (MICROZIDE) 12.5 mg, oral, Daily    hydroxychloroquine (PLAQUENIL) 400 mg, oral, 2 times daily    mupirocin (Bactroban) 2 % ointment Topical, 3 times daily, apply to affected area    sertraline (ZOLOFT) 50 mg, oral, Daily     Allergies   Allergen Reactions    Hydrocodone-Acetaminophen Nausea Only  and Nausea/vomiting       Objective   Vitals:    11/08/24 0920   BP: 126/86   Pulse: 90   Weight: 120 kg (264 lb)   Height: 1.524 m (5')     Body mass index is 51.56 kg/m².    BP Readings from Last 3 Encounters:   11/08/24 126/86   09/20/24 119/69   07/24/24 133/80      Wt Readings from Last 3 Encounters:   11/08/24 120 kg (264 lb)   07/24/24 118 kg (260 lb)   05/15/24 118 kg (261 lb)        No visits with results within 1 Month(s) from this visit.   Latest known visit with results is:   Lab on 07/24/2024   Component Date Value    Angio Converting Enzyme 07/24/2024 21     Citrulline Antibody, IgG 07/24/2024 <1     C-Reactive Protein 07/24/2024 2.39 (H)     Rheumatoid Factor 07/24/2024 <10     Sedimentation Rate 07/24/2024 41 (H)      Physical Exam  CONSTITUTIONAL - well nourished, well developed, looks like stated age, in no acute distress, not ill-appearing, and not tired appearing  ENT - TM's intact, no injection, no signs of infection, uvula midline, normal tongue movement and swollen tonsils, left nasal passage without discharge and patent, right nasal passage with swelling, irritation and nodule with active bleeding, right sinuses swollen   NECK - supple without rigidity, no neck mass was observed, no thyromegaly or thyroid nodules  CHEST - clear to auscultation, no wheezing, no crackles and no rales, good effort  CARDIAC - regular rate and regular rhythm, no skipped beats, no murmur  EXTREMITIES - no obvious or evident edema, no obvious or evident deformities  PSYCHIATRIC - alert, pleasant and cordial, age-appropriate  IMMUNOLOGIC - right cervical lymphadenopathy    Assessment/Plan   Problem List Items Addressed This Visit       PARDEEP (generalized anxiety disorder)     Stable, no changes to mediation recommended          Relevant Medications    sertraline (Zoloft) 50 mg tablet    Venous insufficiency    Relevant Medications    hydroCHLOROthiazide (Microzide) 12.5 mg tablet    Primary hypertension     Stable, no  changes to medication recommended.           Relevant Medications    amLODIPine (Norvasc) 5 mg tablet    hydroCHLOROthiazide (Microzide) 12.5 mg tablet    Cutaneous sarcoidosis (CMS-HCC)    Relevant Orders    Referral to Rheumatology    Internal nasal lesion     Unsure if this is related to her cutaneous sarcoidosis or just a regular infected nasal passage. I prescribed Keflex 500 mg 3 times a day for 10 days along with Mupirocin ointment to be used intranasally.  Recommend reaching out to her current rheumatologist to determine if this could be related to sarcoidosis.  Follow up if no improvement with antibiotic and ointment.           Relevant Medications    cephalexin (Keflex) 500 mg capsule    mupirocin (Bactroban) 2 % ointment     Other Visit Diagnoses       Encounter for preventive health examination    -  Primary    Relevant Orders    CBC    Comprehensive Metabolic Panel    Hemoglobin A1C    Lipid Panel    TSH with reflex to Free T4 if abnormal    Encounter for immunization        Relevant Orders    Flu vaccine, trivalent, preservative free, age 6 months and greater (Fluraix/Fluzone/Flulaval) (Completed)    Polyarthralgia        Relevant Orders    Referral to Rheumatology        Please follow up in 6 months

## 2024-11-08 NOTE — ASSESSMENT & PLAN NOTE
I prescribed Keflex 500 mg 3 times a day for 10 days along with Mupirocin ointment to be used intranasally.  I placed a referral for Rheumatology to determine if this could be related to sarcoidosis.  Follow up if no improvement with antibiotic and ointment.

## 2024-11-09 LAB
EST. AVERAGE GLUCOSE BLD GHB EST-MCNC: 120 MG/DL
HBA1C MFR BLD: 5.8 %

## 2024-11-18 ENCOUNTER — APPOINTMENT (OUTPATIENT)
Dept: OPHTHALMOLOGY | Facility: CLINIC | Age: 34
End: 2024-11-18
Payer: COMMERCIAL

## 2024-11-18 DIAGNOSIS — Z79.899 LONG-TERM USE OF PLAQUENIL: ICD-10-CM

## 2024-11-18 DIAGNOSIS — M25.50 POLYARTHRALGIA: ICD-10-CM

## 2024-11-18 DIAGNOSIS — D86.3 CUTANEOUS SARCOIDOSIS (CMS-HCC): ICD-10-CM

## 2024-11-18 PROCEDURE — 92083 EXTENDED VISUAL FIELD XM: CPT

## 2024-11-18 PROCEDURE — 92134 CPTRZ OPH DX IMG PST SGM RTA: CPT

## 2024-11-18 PROCEDURE — 92004 COMPRE OPH EXAM NEW PT 1/>: CPT

## 2024-11-18 ASSESSMENT — CONF VISUAL FIELD
OS_NORMAL: 1
OD_NORMAL: 1
OS_INFERIOR_TEMPORAL_RESTRICTION: 0
OS_INFERIOR_NASAL_RESTRICTION: 0
OD_SUPERIOR_NASAL_RESTRICTION: 0
OD_SUPERIOR_TEMPORAL_RESTRICTION: 0
OS_SUPERIOR_NASAL_RESTRICTION: 0
OS_SUPERIOR_TEMPORAL_RESTRICTION: 0
OD_INFERIOR_TEMPORAL_RESTRICTION: 0
OD_INFERIOR_NASAL_RESTRICTION: 0

## 2024-11-18 ASSESSMENT — VISUAL ACUITY
OD_CC: 20/20
METHOD: SNELLEN - LINEAR
OS_CC: 20/20
CORRECTION_TYPE: CONTACTS

## 2024-11-18 ASSESSMENT — TONOMETRY
IOP_METHOD: GOLDMANN APPLANATION
OD_IOP_MMHG: 16
OS_IOP_MMHG: 16

## 2024-11-18 ASSESSMENT — EXTERNAL EXAM - LEFT EYE: OS_EXAM: NORMAL

## 2024-11-18 ASSESSMENT — SLIT LAMP EXAM - LIDS
COMMENTS: NORMAL
COMMENTS: NORMAL

## 2024-11-18 ASSESSMENT — EXTERNAL EXAM - RIGHT EYE: OD_EXAM: NORMAL

## 2024-11-18 NOTE — PROGRESS NOTES
Long-term use of [id number]  - Used for sarcoidosis  - Duration: 2 months (started Sep 2024)  - Dose: 400 mg/day  - No renal or liver disease  - No evidence of pre-existing maculopathy  - No visual complaints    - OCT 11/18/24   -----OD: WNL; Normal foveal contour, RPE and outer retinal layers. No IRF or SRF  -----OS: WNL; Normal foveal contour, RPE and outer retinal layers. No IRF or SRF    - HVF (reliable OU) - Full field both eyes (OU)    Impression:  - No evidence of hydroxychloroquine toxicity on exam today, will repeat testing in 12 months

## 2025-02-13 ENCOUNTER — OFFICE VISIT (OUTPATIENT)
Dept: RHEUMATOLOGY | Facility: CLINIC | Age: 35
End: 2025-02-13
Payer: COMMERCIAL

## 2025-02-13 VITALS
BODY MASS INDEX: 51.56 KG/M2 | DIASTOLIC BLOOD PRESSURE: 81 MMHG | OXYGEN SATURATION: 98 % | WEIGHT: 264 LBS | SYSTOLIC BLOOD PRESSURE: 144 MMHG | HEART RATE: 78 BPM

## 2025-02-13 DIAGNOSIS — M25.50 POLYARTHRALGIA: ICD-10-CM

## 2025-02-13 DIAGNOSIS — D86.3 CUTANEOUS SARCOIDOSIS (CMS-HCC): ICD-10-CM

## 2025-02-13 DIAGNOSIS — M51.16 RADICULOPATHY DUE TO DISORDER OF INTERVERTEBRAL DISC OF LUMBAR SPINE: Primary | ICD-10-CM

## 2025-02-13 PROCEDURE — 3077F SYST BP >= 140 MM HG: CPT | Performed by: INTERNAL MEDICINE

## 2025-02-13 PROCEDURE — 3079F DIAST BP 80-89 MM HG: CPT | Performed by: INTERNAL MEDICINE

## 2025-02-13 PROCEDURE — 99215 OFFICE O/P EST HI 40 MIN: CPT | Performed by: INTERNAL MEDICINE

## 2025-02-13 PROCEDURE — 1036F TOBACCO NON-USER: CPT | Performed by: INTERNAL MEDICINE

## 2025-02-13 ASSESSMENT — ENCOUNTER SYMPTOMS
SHORTNESS OF BREATH: 0
ABDOMINAL PAIN: 0
OCCASIONAL FEELINGS OF UNSTEADINESS: 0
LOSS OF SENSATION IN FEET: 0
DEPRESSION: 0
DIZZINESS: 1
FATIGUE: 1
DIARRHEA: 1
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
## 2025-02-14 DIAGNOSIS — E55.9 VITAMIN D DEFICIENCY: Primary | ICD-10-CM

## 2025-02-15 LAB
25(OH)D3+25(OH)D2 SERPL-MCNC: 9 NG/ML (ref 30–100)
ANA SER QL IF: NEGATIVE
CRP SERPL-MCNC: 26.4 MG/L
DSDNA AB SER-ACNC: <1 IU/ML
ENA RNP AB SER-ACNC: NORMAL AI
ENA SM AB SER IA-ACNC: NORMAL AI
ENA SM+RNP AB SER IA-ACNC: NORMAL AI
ENA SS-A AB SER IA-ACNC: NORMAL AI
ENA SS-B AB SER IA-ACNC: NORMAL AI
ERYTHROCYTE [SEDIMENTATION RATE] IN BLOOD BY WESTERGREN METHOD: 19 MM/H
HLA-B27 QL NAA+PROBE: NORMAL
NUCLEOSOME AB SER IA-ACNC: NORMAL AI
QUEST HLAB27 TYPING RESULTS REVIEWED BY:: NORMAL

## 2025-02-19 LAB
25(OH)D3+25(OH)D2 SERPL-MCNC: 9 NG/ML (ref 30–100)
ANA SER QL IF: NEGATIVE
CRP SERPL-MCNC: 26.4 MG/L
DSDNA AB SER-ACNC: <1 IU/ML
ENA RNP AB SER-ACNC: NORMAL AI
ENA SM AB SER IA-ACNC: NORMAL AI
ENA SM+RNP AB SER IA-ACNC: NORMAL AI
ENA SS-A AB SER IA-ACNC: NORMAL AI
ENA SS-B AB SER IA-ACNC: NORMAL AI
ERYTHROCYTE [SEDIMENTATION RATE] IN BLOOD BY WESTERGREN METHOD: 19 MM/H
HLA-B27 QL NAA+PROBE: NEGATIVE
NUCLEOSOME AB SER IA-ACNC: NORMAL AI
QUEST HLAB27 TYPING RESULTS REVIEWED BY:: NORMAL

## 2025-02-20 ENCOUNTER — HOSPITAL ENCOUNTER (OUTPATIENT)
Dept: RADIOLOGY | Facility: CLINIC | Age: 35
Discharge: HOME | End: 2025-02-20
Payer: COMMERCIAL

## 2025-02-20 DIAGNOSIS — M51.16 RADICULOPATHY DUE TO DISORDER OF INTERVERTEBRAL DISC OF LUMBAR SPINE: ICD-10-CM

## 2025-02-20 PROCEDURE — 72100 X-RAY EXAM L-S SPINE 2/3 VWS: CPT

## 2025-02-21 ENCOUNTER — PATIENT MESSAGE (OUTPATIENT)
Dept: RHEUMATOLOGY | Facility: CLINIC | Age: 35
End: 2025-02-21
Payer: COMMERCIAL

## 2025-02-21 RX ORDER — CHOLECALCIFEROL (VITAMIN D3) 1250 MCG
50000 TABLET ORAL
Qty: 12 TABLET | Refills: 1 | Status: SHIPPED | OUTPATIENT
Start: 2025-02-21 | End: 2025-08-20

## 2025-03-21 ENCOUNTER — APPOINTMENT (OUTPATIENT)
Dept: RHEUMATOLOGY | Facility: CLINIC | Age: 35
End: 2025-03-21
Payer: COMMERCIAL

## 2025-03-26 DIAGNOSIS — R53.83 OTHER FATIGUE: ICD-10-CM

## 2025-03-26 DIAGNOSIS — D86.3 CUTANEOUS SARCOIDOSIS (CMS-HCC): ICD-10-CM

## 2025-03-26 DIAGNOSIS — M79.10 MYALGIA: ICD-10-CM

## 2025-03-26 DIAGNOSIS — M25.50 POLYARTHRALGIA: ICD-10-CM

## 2025-03-26 RX ORDER — CELECOXIB 200 MG/1
200 CAPSULE ORAL 2 TIMES DAILY
Qty: 60 CAPSULE | Refills: 5 | Status: SHIPPED | OUTPATIENT
Start: 2025-03-26

## 2025-03-27 ENCOUNTER — APPOINTMENT (OUTPATIENT)
Dept: PRIMARY CARE | Facility: CLINIC | Age: 35
End: 2025-03-27
Payer: COMMERCIAL

## 2025-03-28 ENCOUNTER — OFFICE VISIT (OUTPATIENT)
Dept: PRIMARY CARE | Facility: CLINIC | Age: 35
End: 2025-03-28
Payer: COMMERCIAL

## 2025-03-28 VITALS
SYSTOLIC BLOOD PRESSURE: 122 MMHG | BODY MASS INDEX: 52.61 KG/M2 | HEART RATE: 83 BPM | OXYGEN SATURATION: 98 % | WEIGHT: 268 LBS | HEIGHT: 60 IN | DIASTOLIC BLOOD PRESSURE: 68 MMHG

## 2025-03-28 DIAGNOSIS — N39.0 URINARY TRACT INFECTION WITHOUT HEMATURIA, SITE UNSPECIFIED: ICD-10-CM

## 2025-03-28 DIAGNOSIS — R30.0 DYSURIA: Primary | ICD-10-CM

## 2025-03-28 DIAGNOSIS — R35.0 URINARY FREQUENCY: ICD-10-CM

## 2025-03-28 LAB
POC APPEARANCE, URINE: ABNORMAL
POC BILIRUBIN, URINE: ABNORMAL
POC BLOOD, URINE: NEGATIVE
POC COLOR, URINE: YELLOW
POC GLUCOSE, URINE: NEGATIVE MG/DL
POC KETONES, URINE: NEGATIVE MG/DL
POC LEUKOCYTES, URINE: NEGATIVE
POC NITRITE,URINE: NEGATIVE
POC PH, URINE: 6 PH
POC PROTEIN, URINE: ABNORMAL MG/DL
POC SPECIFIC GRAVITY, URINE: 1.02
POC UROBILINOGEN, URINE: 0.2 EU/DL

## 2025-03-28 PROCEDURE — 3008F BODY MASS INDEX DOCD: CPT | Performed by: STUDENT IN AN ORGANIZED HEALTH CARE EDUCATION/TRAINING PROGRAM

## 2025-03-28 PROCEDURE — 3078F DIAST BP <80 MM HG: CPT | Performed by: STUDENT IN AN ORGANIZED HEALTH CARE EDUCATION/TRAINING PROGRAM

## 2025-03-28 PROCEDURE — 1036F TOBACCO NON-USER: CPT | Performed by: STUDENT IN AN ORGANIZED HEALTH CARE EDUCATION/TRAINING PROGRAM

## 2025-03-28 PROCEDURE — 3074F SYST BP LT 130 MM HG: CPT | Performed by: STUDENT IN AN ORGANIZED HEALTH CARE EDUCATION/TRAINING PROGRAM

## 2025-03-28 PROCEDURE — 81002 URINALYSIS NONAUTO W/O SCOPE: CPT | Performed by: STUDENT IN AN ORGANIZED HEALTH CARE EDUCATION/TRAINING PROGRAM

## 2025-03-28 PROCEDURE — 99213 OFFICE O/P EST LOW 20 MIN: CPT | Performed by: STUDENT IN AN ORGANIZED HEALTH CARE EDUCATION/TRAINING PROGRAM

## 2025-03-28 RX ORDER — HYDROXYCHLOROQUINE SULFATE 200 MG/1
200 TABLET, FILM COATED ORAL 2 TIMES DAILY
COMMUNITY

## 2025-03-28 RX ORDER — NITROFURANTOIN 25; 75 MG/1; MG/1
100 CAPSULE ORAL 2 TIMES DAILY
Qty: 14 CAPSULE | Refills: 0 | Status: SHIPPED | OUTPATIENT
Start: 2025-03-28 | End: 2025-04-04

## 2025-03-28 ASSESSMENT — PATIENT HEALTH QUESTIONNAIRE - PHQ9
1. LITTLE INTEREST OR PLEASURE IN DOING THINGS: NOT AT ALL
SUM OF ALL RESPONSES TO PHQ9 QUESTIONS 1 AND 2: 0
2. FEELING DOWN, DEPRESSED OR HOPELESS: NOT AT ALL

## 2025-03-28 NOTE — PROGRESS NOTES
Casa Verdugo is a 35 y.o. year old female presenting for UTI       HPI:  Casa is a 35-year-old female who presents with symptoms suggestive of a urinary tract infection (UTI). She reports experiencing pain and a burning sensation during urination, along with an increased frequency of urination. And noted hematuria twice in the past week. These symptoms have been present for 1 week.    Casa conducted an at-home UTI test, which showed positive results for leukocytes and negative for nitrites. Casa is requesting a prescription to treat the suspected UTI.    She has no history of similar symptoms in the past. Casa denies any fever, chills, flank or pain. She is not currently taking any medications for these symptoms and has no known drug allergies.      ROS:   All pertinent positive symptoms are included in history of present illness.    All other systems have been reviewed and are negative and noncontributory to this patient's current ailments.    Current Outpatient Medications   Medication Sig Dispense Refill    amLODIPine (Norvasc) 5 mg tablet Take 1 tablet (5 mg) by mouth once daily. 90 tablet 1    celecoxib (CeleBREX) 200 mg capsule Take 1 capsule (200 mg) by mouth 2 times a day. 60 capsule 5    cholecalciferol (Vitamin D3) 1,250 mcg (50,000 unit) tablet Take 1 tablet (50,000 Units) by mouth every 7 days. 12 tablet 1    hydroCHLOROthiazide (Microzide) 12.5 mg tablet Take 1 tablet (12.5 mg) by mouth once daily. 90 tablet 1    sertraline (Zoloft) 50 mg tablet Take 1 tablet (50 mg) by mouth once daily. 90 tablet 1    hydroxychloroquine (Plaquenil) 200 mg tablet Take 1 tablet (200 mg) by mouth 2 times a day.      nitrofurantoin, macrocrystal-monohydrate, (Macrobid) 100 mg capsule Take 1 capsule (100 mg) by mouth 2 times a day for 7 days. 14 capsule 0     No current facility-administered medications for this visit.       OBJECTIVE  Visit Vitals  /68   Pulse 83   Ht 1.524 m (5')   Wt 122 kg (268 lb)    SpO2 98%   BMI 52.34 kg/m²   Smoking Status Former   BSA 2.27 m²        Physical Exam:  GENERAL: Alert, oriented, pleasant, in no acute distress  HEENT: Head normocephalic, atraumatic  CV: Heart with regular rate and rhythm, normal S1/S2, no murmurs; normal peripheral pulses- radial and dorsalis pedis palpable  LUNGS: CTAB without wheezing, rhonchi or rales; good respiratory effort, no increased work of breathing  ABDOMEN: soft, tenderness noted on the right lower quadrant ; +BS in all four quadrants  EXTREMITIES: no edema, no cyanosis  PSYCH: Appropriate mood and affect  SKIN: No rashes or lesions appreciated    Assessment/Plan   Diagnoses and all orders for this visit:  Dysuria  -     POCT UA (nonautomated) manually resulted  -     Urine Culture  -     nitrofurantoin, macrocrystal-monohydrate, (Macrobid) 100 mg capsule; Take 1 capsule (100 mg) by mouth 2 times a day for 7 days.  Urinary frequency  -     Urine Culture  -     nitrofurantoin, macrocrystal-monohydrate, (Macrobid) 100 mg capsule; Take 1 capsule (100 mg) by mouth 2 times a day for 7 days.  Urinary tract infection without hematuria, site unspecified  Based on symptoms will treat for bacterial UTI while we wait for the culture. If symptoms worsen or she develops new symptoms, she should contact the office.

## 2025-03-28 NOTE — ASSESSMENT & PLAN NOTE
Take Macrobid as prescribed   Complete the full course of antibiotics even if symptoms improve before finishing the medication.  If symptoms persist or worsen after 3 days of treatment, or if new symptoms such as fever, back pain, or hematuria develop, contact the office for further evaluation.

## 2025-03-30 LAB — BACTERIA UR CULT: NORMAL

## 2025-04-02 ENCOUNTER — HOSPITAL ENCOUNTER (OUTPATIENT)
Dept: RADIOLOGY | Facility: HOSPITAL | Age: 35
Discharge: HOME | End: 2025-04-02
Payer: COMMERCIAL

## 2025-04-02 DIAGNOSIS — M51.16 RADICULOPATHY DUE TO DISORDER OF INTERVERTEBRAL DISC OF LUMBAR SPINE: ICD-10-CM

## 2025-04-02 PROCEDURE — 72148 MRI LUMBAR SPINE W/O DYE: CPT

## 2025-04-02 PROCEDURE — 72148 MRI LUMBAR SPINE W/O DYE: CPT | Performed by: RADIOLOGY

## 2025-04-16 ENCOUNTER — PATIENT MESSAGE (OUTPATIENT)
Dept: RHEUMATOLOGY | Facility: CLINIC | Age: 35
End: 2025-04-16
Payer: COMMERCIAL

## 2025-05-22 ENCOUNTER — APPOINTMENT (OUTPATIENT)
Dept: RHEUMATOLOGY | Facility: CLINIC | Age: 35
End: 2025-05-22
Payer: COMMERCIAL

## 2025-05-22 ENCOUNTER — OFFICE VISIT (OUTPATIENT)
Dept: RHEUMATOLOGY | Facility: CLINIC | Age: 35
End: 2025-05-22
Payer: COMMERCIAL

## 2025-05-22 VITALS
DIASTOLIC BLOOD PRESSURE: 82 MMHG | WEIGHT: 275.8 LBS | HEART RATE: 78 BPM | OXYGEN SATURATION: 99 % | SYSTOLIC BLOOD PRESSURE: 139 MMHG | BODY MASS INDEX: 53.86 KG/M2

## 2025-05-22 DIAGNOSIS — D86.3 CUTANEOUS SARCOIDOSIS: Primary | ICD-10-CM

## 2025-05-22 DIAGNOSIS — M51.16 RADICULOPATHY DUE TO DISORDER OF INTERVERTEBRAL DISC OF LUMBAR SPINE: ICD-10-CM

## 2025-05-22 DIAGNOSIS — E55.9 VITAMIN D DEFICIENCY: ICD-10-CM

## 2025-05-22 PROCEDURE — 1036F TOBACCO NON-USER: CPT | Performed by: INTERNAL MEDICINE

## 2025-05-22 PROCEDURE — 3079F DIAST BP 80-89 MM HG: CPT | Performed by: INTERNAL MEDICINE

## 2025-05-22 PROCEDURE — 99214 OFFICE O/P EST MOD 30 MIN: CPT | Performed by: INTERNAL MEDICINE

## 2025-05-22 PROCEDURE — 3075F SYST BP GE 130 - 139MM HG: CPT | Performed by: INTERNAL MEDICINE

## 2025-05-22 ASSESSMENT — ENCOUNTER SYMPTOMS
ABDOMINAL PAIN: 0
LOSS OF SENSATION IN FEET: 0
DIZZINESS: 1
OCCASIONAL FEELINGS OF UNSTEADINESS: 0
SHORTNESS OF BREATH: 0
DEPRESSION: 0
DIARRHEA: 1
FATIGUE: 1

## 2025-05-22 ASSESSMENT — PATIENT HEALTH QUESTIONNAIRE - PHQ9
SUM OF ALL RESPONSES TO PHQ9 QUESTIONS 1 AND 2: 0
1. LITTLE INTEREST OR PLEASURE IN DOING THINGS: NOT AT ALL
2. FEELING DOWN, DEPRESSED OR HOPELESS: NOT AT ALL

## 2025-05-22 NOTE — PROGRESS NOTES
Subjective   Patient ID: Casa Verdugo is a 35 y.o. female who presents for Follow-up.  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 Doan.  Does not have findings consistent with sarcoidosis with Negative ACE level and negative chest CT in July 2024.  Patient also with numbness going down the left lower extremity and lower back pain    Skin biopsy 5/30/2024 midline upper back differential diagnosis is between a granulomatous reaction to tattoo pigment or sarcoidosis.  Histologically it is impossible to distinguish between these 2 possibilities.  Microscopic description showed sections reveal multiple sarcoidal granulomas throughout the dermis.  There is mild associated lymphocytic inflammation.  Tattoo pigment is identified within the granules    Patient returns from her initial visit in February 2025.  At that time I felt it was less likely that she had any systemic sarcoidosis and it was still questionable from her skin biopsy that she had cutaneous sarcoidosis as opposed to granulomatous reaction to tattoo pigment.    She also was complaining of lower back pain with possible radiculopathy symptoms going down the left leg.    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    We had given her some exercises to do.    She still continues to have the same symptoms in her lower back.  She is unfortunately gained weight over these last few months and her periods have become irregular.  She made an appointment with her gynecologist.    She has not had any new skin lesions.  There has not been any irritation to her tattoo    She was also found to have low vitamin D level  Review of Systems   Constitutional:  Positive for fatigue.   HENT:          Chronic sinus congestion   Respiratory:  Negative for shortness of breath.         Past smoker 5 years ago   Cardiovascular:  Negative for chest pain.   Gastrointestinal:  Positive for diarrhea.  Negative for abdominal pain.        Watery/loose   Skin:         Redness on cheeks   Neurological:  Positive for dizziness.        Tingling in the left leg and fingers       Objective   Physical Exam  GEN: NAD A&O x3 appropriate affect  EYES:  no conjunctival redness, eyelids normal  NEURO: 5/5 strength upper and lower extremities, DTR +2 bicep and patellar tendons  SKIN: No bumps on her tattoo on her upper back and maybe a mild 1 on her chest  PULSES: +radials  TENDER POINTS: 0/18   MSK:  Is guarded with her range of motion especially for her back.  Has decreased flexion of the hips and pain in her back with movement of her left hip.  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  I do not feel that she has other sarcoid findings at this time.  Will check her inflammatory markers.  She has gained weight and has had irregular periods and will be seeing gynecology    Lower back pain with possible radiculopathy symptoms going down the left lower extremity   - We did get an MRI It showed minimal degenerative changes throughout without canal stenosis and minimal neural foraminal narrowing at L3-L4.  There was disc bulge and facet arthrosis at L4-L5   - Will move forward with EMG   - Still want her to continue with stretches and exercises for her back    Vitamin D deficiency   - We will recheck her vitamin D level.  Patient was concerned that she might get elevated calcium levels since it is thought that she might have cutaneous sarcoid and I do feel that this less likely she has cutaneous sarcoid and may just have the granulomatous reaction to tattoo pigment.  Will check her calcium level as well  Will see her back in 6 months    Lorelei Washburn MD 05/22/25 2:01 PM

## 2025-05-23 ENCOUNTER — PATIENT MESSAGE (OUTPATIENT)
Dept: RHEUMATOLOGY | Facility: CLINIC | Age: 35
End: 2025-05-23
Payer: COMMERCIAL

## 2025-05-23 LAB
25(OH)D3+25(OH)D2 SERPL-MCNC: 32 NG/ML (ref 30–100)
ALBUMIN SERPL-MCNC: 4.1 G/DL (ref 3.6–5.1)
ALP SERPL-CCNC: 92 U/L (ref 31–125)
ALT SERPL-CCNC: 14 U/L (ref 6–29)
ANION GAP SERPL CALCULATED.4IONS-SCNC: 10 MMOL/L (CALC) (ref 7–17)
AST SERPL-CCNC: 16 U/L (ref 10–30)
BILIRUB SERPL-MCNC: 0.3 MG/DL (ref 0.2–1.2)
BUN SERPL-MCNC: 17 MG/DL (ref 7–25)
CALCIUM SERPL-MCNC: 9 MG/DL (ref 8.6–10.2)
CHLORIDE SERPL-SCNC: 102 MMOL/L (ref 98–110)
CO2 SERPL-SCNC: 26 MMOL/L (ref 20–32)
CREAT SERPL-MCNC: 0.77 MG/DL (ref 0.5–0.97)
CRP SERPL-MCNC: 27.1 MG/L
EGFRCR SERPLBLD CKD-EPI 2021: 102 ML/MIN/1.73M2
ERYTHROCYTE [SEDIMENTATION RATE] IN BLOOD BY WESTERGREN METHOD: 25 MM/H
GLUCOSE SERPL-MCNC: 81 MG/DL (ref 65–99)
POTASSIUM SERPL-SCNC: 3.9 MMOL/L (ref 3.5–5.3)
PROT SERPL-MCNC: 7.2 G/DL (ref 6.1–8.1)
SODIUM SERPL-SCNC: 138 MMOL/L (ref 135–146)

## 2025-05-28 ENCOUNTER — TELEPHONE (OUTPATIENT)
Dept: SURGERY | Facility: HOSPITAL | Age: 35
End: 2025-05-28
Payer: COMMERCIAL

## 2025-06-30 DIAGNOSIS — I87.2 VENOUS INSUFFICIENCY: ICD-10-CM

## 2025-06-30 DIAGNOSIS — I10 PRIMARY HYPERTENSION: ICD-10-CM

## 2025-06-30 RX ORDER — HYDROCHLOROTHIAZIDE 12.5 MG/1
12.5 TABLET ORAL DAILY
Qty: 90 TABLET | Refills: 0 | Status: SHIPPED | OUTPATIENT
Start: 2025-06-30

## 2025-06-30 RX ORDER — AMLODIPINE BESYLATE 5 MG/1
5 TABLET ORAL DAILY
Qty: 90 TABLET | Refills: 0 | Status: SHIPPED | OUTPATIENT
Start: 2025-06-30

## 2025-07-09 ENCOUNTER — APPOINTMENT (OUTPATIENT)
Dept: OBSTETRICS AND GYNECOLOGY | Facility: CLINIC | Age: 35
End: 2025-07-09
Payer: COMMERCIAL

## 2025-08-24 ENCOUNTER — HOSPITAL ENCOUNTER (EMERGENCY)
Facility: HOSPITAL | Age: 35
Discharge: HOME | End: 2025-08-24
Attending: STUDENT IN AN ORGANIZED HEALTH CARE EDUCATION/TRAINING PROGRAM
Payer: COMMERCIAL

## 2025-08-24 ENCOUNTER — APPOINTMENT (OUTPATIENT)
Dept: CARDIOLOGY | Facility: HOSPITAL | Age: 35
End: 2025-08-24
Payer: COMMERCIAL

## 2025-08-24 ENCOUNTER — APPOINTMENT (OUTPATIENT)
Dept: RADIOLOGY | Facility: HOSPITAL | Age: 35
End: 2025-08-24
Payer: COMMERCIAL

## 2025-08-24 VITALS
HEIGHT: 60 IN | RESPIRATION RATE: 16 BRPM | BODY MASS INDEX: 53.01 KG/M2 | TEMPERATURE: 98.2 F | DIASTOLIC BLOOD PRESSURE: 85 MMHG | WEIGHT: 270 LBS | SYSTOLIC BLOOD PRESSURE: 138 MMHG | OXYGEN SATURATION: 99 % | HEART RATE: 78 BPM

## 2025-08-24 DIAGNOSIS — R07.89 CHEST WALL PAIN: Primary | ICD-10-CM

## 2025-08-24 LAB
ALBUMIN SERPL BCP-MCNC: 4 G/DL (ref 3.4–5)
ALP SERPL-CCNC: 78 U/L (ref 33–110)
ALT SERPL W P-5'-P-CCNC: 27 U/L (ref 7–45)
ANION GAP SERPL CALC-SCNC: 10 MMOL/L (ref 10–20)
AST SERPL W P-5'-P-CCNC: 18 U/L (ref 9–39)
B-HCG SERPL-ACNC: <2 MIU/ML
BASOPHILS # BLD AUTO: 0.03 X10*3/UL (ref 0–0.1)
BASOPHILS NFR BLD AUTO: 0.3 %
BILIRUB SERPL-MCNC: 0.3 MG/DL (ref 0–1.2)
BNP SERPL-MCNC: 17 PG/ML (ref 0–99)
BUN SERPL-MCNC: 22 MG/DL (ref 6–23)
CALCIUM SERPL-MCNC: 8.9 MG/DL (ref 8.6–10.3)
CARDIAC TROPONIN I PNL SERPL HS: 3 NG/L (ref 0–13)
CARDIAC TROPONIN I PNL SERPL HS: <3 NG/L (ref 0–13)
CHLORIDE SERPL-SCNC: 104 MMOL/L (ref 98–107)
CO2 SERPL-SCNC: 26 MMOL/L (ref 21–32)
CREAT SERPL-MCNC: 0.7 MG/DL (ref 0.5–1.05)
D DIMER PPP FEU-MCNC: 578 NG/ML FEU
EGFRCR SERPLBLD CKD-EPI 2021: >90 ML/MIN/1.73M*2
EOSINOPHIL # BLD AUTO: 0.09 X10*3/UL (ref 0–0.7)
EOSINOPHIL NFR BLD AUTO: 0.8 %
ERYTHROCYTE [DISTWIDTH] IN BLOOD BY AUTOMATED COUNT: 16.6 % (ref 11.5–14.5)
GLUCOSE SERPL-MCNC: 101 MG/DL (ref 74–99)
HCT VFR BLD AUTO: 36.3 % (ref 36–46)
HGB BLD-MCNC: 11.6 G/DL (ref 12–16)
IMM GRANULOCYTES # BLD AUTO: 0.05 X10*3/UL (ref 0–0.7)
IMM GRANULOCYTES NFR BLD AUTO: 0.4 % (ref 0–0.9)
LYMPHOCYTES # BLD AUTO: 2.23 X10*3/UL (ref 1.2–4.8)
LYMPHOCYTES NFR BLD AUTO: 19.3 %
MCH RBC QN AUTO: 25.3 PG (ref 26–34)
MCHC RBC AUTO-ENTMCNC: 32 G/DL (ref 32–36)
MCV RBC AUTO: 79 FL (ref 80–100)
MONOCYTES # BLD AUTO: 0.57 X10*3/UL (ref 0.1–1)
MONOCYTES NFR BLD AUTO: 4.9 %
NEUTROPHILS # BLD AUTO: 8.59 X10*3/UL (ref 1.2–7.7)
NEUTROPHILS NFR BLD AUTO: 74.3 %
NRBC BLD-RTO: 0 /100 WBCS (ref 0–0)
PLATELET # BLD AUTO: 296 X10*3/UL (ref 150–450)
POTASSIUM SERPL-SCNC: 3.6 MMOL/L (ref 3.5–5.3)
PROT SERPL-MCNC: 7.3 G/DL (ref 6.4–8.2)
RBC # BLD AUTO: 4.58 X10*6/UL (ref 4–5.2)
SARS-COV-2 RNA RESP QL NAA+PROBE: NOT DETECTED
SODIUM SERPL-SCNC: 136 MMOL/L (ref 136–145)
WBC # BLD AUTO: 11.6 X10*3/UL (ref 4.4–11.3)

## 2025-08-24 PROCEDURE — 99285 EMERGENCY DEPT VISIT HI MDM: CPT | Mod: 25 | Performed by: STUDENT IN AN ORGANIZED HEALTH CARE EDUCATION/TRAINING PROGRAM

## 2025-08-24 PROCEDURE — 2500000004 HC RX 250 GENERAL PHARMACY W/ HCPCS (ALT 636 FOR OP/ED): Performed by: NURSE PRACTITIONER

## 2025-08-24 PROCEDURE — 71275 CT ANGIOGRAPHY CHEST: CPT | Performed by: RADIOLOGY

## 2025-08-24 PROCEDURE — 84484 ASSAY OF TROPONIN QUANT: CPT | Performed by: NURSE PRACTITIONER

## 2025-08-24 PROCEDURE — 71046 X-RAY EXAM CHEST 2 VIEWS: CPT

## 2025-08-24 PROCEDURE — 96374 THER/PROPH/DIAG INJ IV PUSH: CPT | Mod: 59

## 2025-08-24 PROCEDURE — 85025 COMPLETE CBC W/AUTO DIFF WBC: CPT | Performed by: NURSE PRACTITIONER

## 2025-08-24 PROCEDURE — 71046 X-RAY EXAM CHEST 2 VIEWS: CPT | Performed by: RADIOLOGY

## 2025-08-24 PROCEDURE — 71275 CT ANGIOGRAPHY CHEST: CPT

## 2025-08-24 PROCEDURE — 83880 ASSAY OF NATRIURETIC PEPTIDE: CPT | Performed by: NURSE PRACTITIONER

## 2025-08-24 PROCEDURE — 80053 COMPREHEN METABOLIC PANEL: CPT | Performed by: NURSE PRACTITIONER

## 2025-08-24 PROCEDURE — 2550000001 HC RX 255 CONTRASTS: Performed by: STUDENT IN AN ORGANIZED HEALTH CARE EDUCATION/TRAINING PROGRAM

## 2025-08-24 PROCEDURE — 36415 COLL VENOUS BLD VENIPUNCTURE: CPT | Performed by: NURSE PRACTITIONER

## 2025-08-24 PROCEDURE — 87635 SARS-COV-2 COVID-19 AMP PRB: CPT | Performed by: NURSE PRACTITIONER

## 2025-08-24 PROCEDURE — 93005 ELECTROCARDIOGRAM TRACING: CPT

## 2025-08-24 PROCEDURE — 2500000001 HC RX 250 WO HCPCS SELF ADMINISTERED DRUGS (ALT 637 FOR MEDICARE OP): Performed by: STUDENT IN AN ORGANIZED HEALTH CARE EDUCATION/TRAINING PROGRAM

## 2025-08-24 PROCEDURE — 85379 FIBRIN DEGRADATION QUANT: CPT | Performed by: NURSE PRACTITIONER

## 2025-08-24 PROCEDURE — 84702 CHORIONIC GONADOTROPIN TEST: CPT | Performed by: NURSE PRACTITIONER

## 2025-08-24 RX ORDER — CYCLOBENZAPRINE HCL 10 MG
5 TABLET ORAL 2 TIMES DAILY PRN
Qty: 10 TABLET | Refills: 0 | Status: SHIPPED | OUTPATIENT
Start: 2025-08-24 | End: 2025-09-03

## 2025-08-24 RX ORDER — KETOROLAC TROMETHAMINE 30 MG/ML
15 INJECTION, SOLUTION INTRAMUSCULAR; INTRAVENOUS ONCE
Status: COMPLETED | OUTPATIENT
Start: 2025-08-24 | End: 2025-08-24

## 2025-08-24 RX ORDER — CYCLOBENZAPRINE HCL 10 MG
5 TABLET ORAL ONCE
Status: COMPLETED | OUTPATIENT
Start: 2025-08-24 | End: 2025-08-24

## 2025-08-24 RX ADMIN — CYCLOBENZAPRINE 5 MG: 10 TABLET, FILM COATED ORAL at 22:06

## 2025-08-24 RX ADMIN — KETOROLAC TROMETHAMINE 15 MG: 30 INJECTION, SOLUTION INTRAMUSCULAR at 17:17

## 2025-08-24 RX ADMIN — IOHEXOL 75 ML: 350 INJECTION, SOLUTION INTRAVENOUS at 19:59

## 2025-08-24 ASSESSMENT — PAIN SCALES - GENERAL
PAINLEVEL_OUTOF10: 3
PAINLEVEL_OUTOF10: 2
PAINLEVEL_OUTOF10: 4

## 2025-08-24 ASSESSMENT — PAIN DESCRIPTION - LOCATION
LOCATION: CHEST
LOCATION: CHEST

## 2025-08-24 ASSESSMENT — HEART SCORE
RISK FACTORS: 1-2 RISK FACTORS
HEART SCORE: 1
HISTORY: SLIGHTLY SUSPICIOUS
AGE: <45
ECG: NORMAL
TROPONIN: LESS THAN OR EQUAL TO NORMAL LIMIT

## 2025-08-24 ASSESSMENT — PAIN - FUNCTIONAL ASSESSMENT
PAIN_FUNCTIONAL_ASSESSMENT: 0-10
PAIN_FUNCTIONAL_ASSESSMENT: 0-10

## 2025-08-24 ASSESSMENT — PAIN DESCRIPTION - DESCRIPTORS: DESCRIPTORS: ACHING

## 2025-09-11 ENCOUNTER — APPOINTMENT (OUTPATIENT)
Dept: PRIMARY CARE | Facility: CLINIC | Age: 35
End: 2025-09-11
Payer: COMMERCIAL

## 2025-10-03 ENCOUNTER — APPOINTMENT (OUTPATIENT)
Dept: PODIATRY | Facility: CLINIC | Age: 35
End: 2025-10-03
Payer: COMMERCIAL

## 2025-11-19 ENCOUNTER — APPOINTMENT (OUTPATIENT)
Dept: RHEUMATOLOGY | Facility: CLINIC | Age: 35
End: 2025-11-19
Payer: COMMERCIAL